# Patient Record
Sex: FEMALE | Race: WHITE | NOT HISPANIC OR LATINO | Employment: FULL TIME | URBAN - METROPOLITAN AREA
[De-identification: names, ages, dates, MRNs, and addresses within clinical notes are randomized per-mention and may not be internally consistent; named-entity substitution may affect disease eponyms.]

---

## 2017-05-05 ENCOUNTER — GENERIC CONVERSION - ENCOUNTER (OUTPATIENT)
Dept: OTHER | Facility: OTHER | Age: 47
End: 2017-05-05

## 2017-05-05 ENCOUNTER — ALLSCRIPTS OFFICE VISIT (OUTPATIENT)
Dept: OTHER | Facility: OTHER | Age: 47
End: 2017-05-05

## 2017-05-05 ENCOUNTER — APPOINTMENT (OUTPATIENT)
Dept: LAB | Facility: CLINIC | Age: 47
End: 2017-05-05
Payer: COMMERCIAL

## 2017-05-05 ENCOUNTER — TRANSCRIBE ORDERS (OUTPATIENT)
Dept: LAB | Facility: CLINIC | Age: 47
End: 2017-05-05

## 2017-05-05 DIAGNOSIS — Z00.00 ROUTINE GENERAL MEDICAL EXAMINATION AT A HEALTH CARE FACILITY: ICD-10-CM

## 2017-05-05 DIAGNOSIS — R73.01 IMPAIRED FASTING GLUCOSE: ICD-10-CM

## 2017-05-05 DIAGNOSIS — Z12.39 ENCOUNTER FOR OTHER SCREENING FOR MALIGNANT NEOPLASM OF BREAST: ICD-10-CM

## 2017-05-05 DIAGNOSIS — M54.9 DORSALGIA: Primary | ICD-10-CM

## 2017-05-05 DIAGNOSIS — R51.9 FACIAL PAIN: ICD-10-CM

## 2017-05-05 DIAGNOSIS — F06.4 ANXIETY DISORDER IN CONDITIONS CLASSIFIED ELSEWHERE: ICD-10-CM

## 2017-05-05 DIAGNOSIS — G47.00 INSOMNIA: ICD-10-CM

## 2017-05-05 DIAGNOSIS — G47.00 PERSISTENT DISORDER OF INITIATING OR MAINTAINING SLEEP: ICD-10-CM

## 2017-05-05 DIAGNOSIS — M54.9 DORSALGIA: ICD-10-CM

## 2017-05-05 DIAGNOSIS — F41.8 OTHER SPECIFIED ANXIETY DISORDERS: ICD-10-CM

## 2017-05-05 LAB
ALBUMIN SERPL BCP-MCNC: 3.9 G/DL (ref 3.5–5)
ALP SERPL-CCNC: 58 U/L (ref 46–116)
ALT SERPL W P-5'-P-CCNC: 23 U/L (ref 12–78)
AMYLASE SERPL-CCNC: 36 IU/L (ref 25–115)
ANION GAP SERPL CALCULATED.3IONS-SCNC: 7 MMOL/L (ref 4–13)
AST SERPL W P-5'-P-CCNC: 22 U/L (ref 5–45)
BASOPHILS # BLD AUTO: 0.06 THOUSANDS/ΜL (ref 0–0.1)
BASOPHILS NFR BLD AUTO: 1 % (ref 0–1)
BILIRUB SERPL-MCNC: 0.41 MG/DL (ref 0.2–1)
BILIRUB UR QL STRIP: 1
BUN SERPL-MCNC: 14 MG/DL (ref 5–25)
CALCIUM SERPL-MCNC: 9.4 MG/DL (ref 8.3–10.1)
CHLORIDE SERPL-SCNC: 103 MMOL/L (ref 100–108)
CHOLEST SERPL-MCNC: 183 MG/DL (ref 50–200)
CLARITY UR: NORMAL
CO2 SERPL-SCNC: 29 MMOL/L (ref 21–32)
COLOR UR: YELLOW
CREAT SERPL-MCNC: 0.6 MG/DL (ref 0.6–1.3)
EOSINOPHIL # BLD AUTO: 0.18 THOUSAND/ΜL (ref 0–0.61)
EOSINOPHIL NFR BLD AUTO: 3 % (ref 0–6)
ERYTHROCYTE [DISTWIDTH] IN BLOOD BY AUTOMATED COUNT: 13.7 % (ref 11.6–15.1)
ERYTHROCYTE [SEDIMENTATION RATE] IN BLOOD: 8 MM/HOUR (ref 0–20)
EST. AVERAGE GLUCOSE BLD GHB EST-MCNC: 94 MG/DL
GFR SERPL CREATININE-BSD FRML MDRD: >60 ML/MIN/1.73SQ M
GLUCOSE (HISTORICAL): NORMAL
GLUCOSE P FAST SERPL-MCNC: 85 MG/DL (ref 65–99)
HBA1C MFR BLD: 4.9 % (ref 4.2–6.3)
HCT VFR BLD AUTO: 42.5 % (ref 34.8–46.1)
HDLC SERPL-MCNC: 77 MG/DL (ref 40–60)
HGB BLD-MCNC: 13.8 G/DL (ref 11.5–15.4)
HGB UR QL STRIP.AUTO: NORMAL
KETONES UR STRIP-MCNC: NORMAL MG/DL
LDLC SERPL CALC-MCNC: 97 MG/DL (ref 0–100)
LEUKOCYTE ESTERASE UR QL STRIP: 75
LIPASE SERPL-CCNC: 276 U/L (ref 73–393)
LYMPHOCYTES # BLD AUTO: 2.84 THOUSANDS/ΜL (ref 0.6–4.47)
LYMPHOCYTES NFR BLD AUTO: 41 % (ref 14–44)
MAGNESIUM SERPL-MCNC: 2.2 MG/DL (ref 1.6–2.6)
MCH RBC QN AUTO: 30.5 PG (ref 26.8–34.3)
MCHC RBC AUTO-ENTMCNC: 32.5 G/DL (ref 31.4–37.4)
MCV RBC AUTO: 94 FL (ref 82–98)
MONOCYTES # BLD AUTO: 0.62 THOUSAND/ΜL (ref 0.17–1.22)
MONOCYTES NFR BLD AUTO: 9 % (ref 4–12)
NEUTROPHILS # BLD AUTO: 3.25 THOUSANDS/ΜL (ref 1.85–7.62)
NEUTS SEG NFR BLD AUTO: 46 % (ref 43–75)
NITRITE UR QL STRIP: NORMAL
NRBC BLD AUTO-RTO: 0 /100 WBCS
PH UR STRIP.AUTO: 5 [PH]
PLATELET # BLD AUTO: 354 THOUSANDS/UL (ref 149–390)
PMV BLD AUTO: 11 FL (ref 8.9–12.7)
POTASSIUM SERPL-SCNC: 3.7 MMOL/L (ref 3.5–5.3)
PROT SERPL-MCNC: 7.3 G/DL (ref 6.4–8.2)
PROT UR STRIP-MCNC: NORMAL MG/DL
RBC # BLD AUTO: 4.53 MILLION/UL (ref 3.81–5.12)
SODIUM SERPL-SCNC: 139 MMOL/L (ref 136–145)
SP GR UR STRIP.AUTO: 1.01
TRIGL SERPL-MCNC: 46 MG/DL
TSH SERPL DL<=0.05 MIU/L-ACNC: 0.82 UIU/ML (ref 0.36–3.74)
UROBILINOGEN UR QL STRIP.AUTO: NORMAL
WBC # BLD AUTO: 6.96 THOUSAND/UL (ref 4.31–10.16)

## 2017-05-05 PROCEDURE — 84443 ASSAY THYROID STIM HORMONE: CPT

## 2017-05-05 PROCEDURE — 83036 HEMOGLOBIN GLYCOSYLATED A1C: CPT | Performed by: FAMILY MEDICINE

## 2017-05-05 PROCEDURE — 85652 RBC SED RATE AUTOMATED: CPT | Performed by: FAMILY MEDICINE

## 2017-05-05 PROCEDURE — 80061 LIPID PANEL: CPT | Performed by: FAMILY MEDICINE

## 2017-05-05 PROCEDURE — 80053 COMPREHEN METABOLIC PANEL: CPT | Performed by: FAMILY MEDICINE

## 2017-05-05 PROCEDURE — 83690 ASSAY OF LIPASE: CPT

## 2017-05-05 PROCEDURE — 36415 COLL VENOUS BLD VENIPUNCTURE: CPT | Performed by: FAMILY MEDICINE

## 2017-05-05 PROCEDURE — 82150 ASSAY OF AMYLASE: CPT

## 2017-05-05 PROCEDURE — 83735 ASSAY OF MAGNESIUM: CPT | Performed by: FAMILY MEDICINE

## 2017-05-05 PROCEDURE — 85025 COMPLETE CBC W/AUTO DIFF WBC: CPT | Performed by: FAMILY MEDICINE

## 2017-05-06 LAB — NTI URINE TUBE (HISTORICAL): NORMAL

## 2017-05-08 LAB
CULTURE RESULT (HISTORICAL): NORMAL
QUESTION/PROBLEM (HISTORICAL): NORMAL

## 2017-05-09 ENCOUNTER — GENERIC CONVERSION - ENCOUNTER (OUTPATIENT)
Dept: OTHER | Facility: OTHER | Age: 47
End: 2017-05-09

## 2017-05-13 ENCOUNTER — GENERIC CONVERSION - ENCOUNTER (OUTPATIENT)
Dept: OTHER | Facility: OTHER | Age: 47
End: 2017-05-13

## 2017-05-15 LAB
ADEQUACY: (HISTORICAL): NORMAL
CLINICIAN PROVIDIED ICD 9 OR 10 (HISTORICAL): NORMAL
COMMENT (HISTORICAL): NORMAL
DIAGNOSIS (HISTORICAL): NORMAL
NOTE: (HISTORICAL): NORMAL
PERFORMED BY (HISTORICAL): NORMAL
REFLEX (HISTORICAL): NORMAL
TEST INFORMATION (HISTORICAL): NORMAL

## 2017-05-16 ENCOUNTER — GENERIC CONVERSION - ENCOUNTER (OUTPATIENT)
Dept: OTHER | Facility: OTHER | Age: 47
End: 2017-05-16

## 2018-01-10 NOTE — RESULT NOTES
Verified Results  Urine Dip Automated- POC [id number] 09:47AM Venetta Antes     Test Name Result Flag Reference   Color Yellow     Clarity Transparent     Leukocytes 75     Nitrite neg     Blood neg     Bilirubin 1     Urobilinogen norm     Protein neg     Ph 5     Specific Gravity 1 015     Ketone neg     Glucose norm         Plan  Abnormal fasting glucose    · (1) LIPASE; Status:Active; Requested for:05May2017;    · (1) MAGNESIUM; Status:Active; Requested for:05May2017;    · (Q) SED RATE BY MODIFIED Jas Beady; Status:Active; Requested MCQ:[id number]; Abnormal fasting glucose, Backache, Depression with anxiety, Health Maintenance,  Headache, Insomnia    · (1) HEMOGLOBIN A1C; Status:Active; Requested DUZ:[id number]; Abnormal fasting glucose, Depression with anxiety, Insomnia    · (1) TSH WITH FT4 REFLEX; Status:Active; Requested PKL:[id number]; Abnormal gynecological examination    · (1) THIN PREP PAP WITH IMAGING; Status:Active; Requested NLO:[id number]; Maturation index required? : No  HPV? : Regardless of Interpretation  Abnormal gynecological examination, Backache    · (1) URINE CULTURE; Source:Urine, Clean Catch; Status:Active; Requested  for:05May2017;   Backache, Depression with anxiety, Health Maintenance, Headache, Insomnia    · (1) AMYLASE; Status:Active; Requested for:05May2017;    · (1) CBC/PLT/DIFF; Status:Active; Requested for:05May2017;    · (1) COMPREHENSIVE METABOLIC PANEL; Status:Active; Requested for:05May2017;   Borderline hyperlipidemia    · (1) LIPID PANEL, FASTING; Status:Active; Requested FWC:[id number];    Health Maintenance    · Urine Dip Automated- POC; Status:Complete;   Done: 88TFL7050 09:47AM  Screening for malignant neoplasm of breast    · * MAMMO SCREENING BILATERAL W CAD; Status:Hold For - Scheduling; Requested  for:05May2017;

## 2018-01-11 NOTE — RESULT NOTES
Verified Results  (1) URINE CULTURE [id number] 12:00AM [facility]     Test Name Result Flag Reference   Urine Culture,Comprehensive NMU6     No urine specimen was received for culture  [facility]) Request Problem [id number] 12:00AM [facility]     Test Name Result Flag Reference   Request Problem NMU6     No urine specimen was received for culture          TEST:  [id number]  Urine Culture,Comprehensive

## 2018-01-12 VITALS
DIASTOLIC BLOOD PRESSURE: 68 MMHG | OXYGEN SATURATION: 98 % | HEIGHT: 64 IN | BODY MASS INDEX: 22.71 KG/M2 | SYSTOLIC BLOOD PRESSURE: 108 MMHG | TEMPERATURE: 98.3 F | WEIGHT: 133 LBS | HEART RATE: 64 BPM | RESPIRATION RATE: 16 BRPM

## 2018-01-12 NOTE — RESULT NOTES
Verified Results  (1) URINE CULTURE [id number] 12:00AM Kenyetta Alex     Test Name Result Flag Reference   Urine Culture,Comprehensive NMU6     No urine specimen was received for culture  [facility]) Request Problem [id number] 12:00AM Kenyetta Alex     Test Name Result Flag Reference   Request Problem NMU6     No urine specimen was received for culture          TEST:  [id number]  Urine Culture,Comprehensive

## 2018-01-15 NOTE — RESULT NOTES
Verified Results  ([facility]) Pap IG, rfx HPV ASCU [id number] 12:00AM Sherie Grubbs   No  of containers  Brittany Urrutia 01 CYTYC Thin Prep Vial     Test Name Result Flag Reference   DIAGNOSIS: Comment     NEGATIVE FOR INTRAEPITHELIAL LESION AND MALIGNANCY  THE CYTOLOGY PROCESSING WAS PERFORMED AT THE [facility] LOCATED AT  [facility], [address], [address] [place] [id number]  Specimen adequacy: Comment     Satisfactory for evaluation  Endocervical and/or squamous metaplastic  cells (endocervical component) are present  Clinician provided ICD10: Comment     Z01 411  M54 9   Performed by: Renee Franz, Cytotechnologist (ASCP)     Brittany Urrutia Note: Comment     The Pap smear is a screening test designed to aid in the detection of  premalignant and malignant conditions of the uterine cervix  It is not a  diagnostic procedure and should not be used as the sole means of detecting  cervical cancer  Both false-positive and false-negative reports do occur  Test Methodology: Comment     This liquid based ThinPrep(R) pap test was screened with the  use of an image guided system    Comment     The HPV DNA reflex criteria were not met with this specimen result  therefore, no HPV testing was performed

## 2018-01-16 NOTE — RESULT NOTES
Verified Results  (1) CBC/PLT/DIFF [id number] 10:56AM Rosana Miller     Test Name Result Flag Reference   WBC COUNT 6 96 Thousand/uL  4 31-10 16   RBC COUNT 4 53 Million/uL  3 81-5 12   HEMOGLOBIN 13 8 g/dL  11 5-15 4   HEMATOCRIT 42 5 %  34 8-46  1   MCV 94 fL  82-98   MCH 30 5 pg  26 8-34 3   MCHC 32 5 g/dL  31 4-37 4   RDW 13 7 %  11 6-15 1   MPV 11 0 fL  8 9-12 7   PLATELET COUNT 040 Thousands/uL  149-390   nRBC AUTOMATED 0 /100 WBCs     NEUTROPHILS RELATIVE PERCENT 46 %  43-75   LYMPHOCYTES RELATIVE PERCENT 41 %  14-44   MONOCYTES RELATIVE PERCENT 9 %  4-12   EOSINOPHILS RELATIVE PERCENT 3 %  0-6   BASOPHILS RELATIVE PERCENT 1 %  0-1   NEUTROPHILS ABSOLUTE COUNT 3 25 Thousands/? ??L  1 85-7 62   LYMPHOCYTES ABSOLUTE COUNT 2 84 Thousands/? ??L  0 60-4 47   MONOCYTES ABSOLUTE COUNT 0 62 Thousand/? ??L  0 17-1 22   EOSINOPHILS ABSOLUTE COUNT 0 18 Thousand/? ??L  0 00-0 61   BASOPHILS ABSOLUTE COUNT 0 06 Thousands/? ??L  0 00-0 10     (1) HEMOGLOBIN A1C [id number] 10:56AM Rosana Miller     Test Name Result Flag Reference   HEMOGLOBIN A1C 4 9 %  4 2-6 3   EST  AVG   GLUCOSE 94 mg/dl       (1) MAGNESIUM [id number] 10:56AM Rosana Miller     Test Name Result Flag Reference   MAGNESIUM 2 2 mg/dL  1 6-2 6     (1) COMPREHENSIVE METABOLIC PANEL [id number] 17:19NM Rosana Miller     Test Name Result Flag Reference   SODIUM 139 mmol/L  136-145   POTASSIUM 3 7 mmol/L  3 5-5 3   CHLORIDE 103 mmol/L  100-108   CARBON DIOXIDE 29 mmol/L  21-32   ANION GAP (CALC) 7 mmol/L  4-13   BLOOD UREA NITROGEN 14 mg/dL  5-25   CREATININE 0 60 mg/dL  0 60-1 30   Standardized to IDMS reference method   CALCIUM 9 4 mg/dL  8 3-10 1   BILI, TOTAL 0 41 mg/dL  0 20-1 00   ALK PHOSPHATAS 58 U/L     ALT (SGPT) 23 U/L  12-78   AST(SGOT) 22 U/L  5-45   ALBUMIN 3 9 g/dL  3 5-5 0   TOTAL PROTEIN 7 3 g/dL  6 4-8 2   eGFR Non-African American      >60 0 ml/min/1 73sq van [employer] Disease Education Program recommendations are as follows:  GFR calculation is accurate only with a steady state creatinine  Chronic Kidney disease less than 60 ml/min/1 73 sq  meters  Kidney failure less than 15 ml/min/1 73 sq  meters  GLUCOSE FASTING 85 mg/dL  65-99     (1) LIPID PANEL, FASTING [id number] 10:56AM Thoundsnetta Opal     Test Name Result Flag Reference   CHOLESTEROL 183 mg/dL     HDL,DIRECT 77 mg/dL H 40-60   Specimen collection should occur prior to Metamizole administration due to the potential for falsely depressed results  LDL CHOLESTEROL CALCULATED 97 mg/dL  0-100   Triglyceride:         Normal              <150 mg/dl       Borderline High    150-199 mg/dl       High               200-499 mg/dl       Very High          >499 mg/dl  Cholesterol:         Desirable        <200 mg/dl      Borderline High  200-239 mg/dl      High             >239 mg/dl  HDL Cholesterol:        High    >59 mg/dL      Low     <41 mg/dL  LDL CALCULATED:    This screening LDL is a calculated result  It does not have the accuracy of the Direct Measured LDL in the monitoring of patients with hyperlipidemia and/or statin therapy  Direct Measure LDL (WQK696) must be ordered separately in these patients  TRIGLYCERIDES 46 mg/dL  <=150   Specimen collection should occur prior to N-Acetylcysteine or Metamizole administration due to the potential for falsely depressed results  (1) AMYLASE [id number] 10:56AM blur Groupa Opal     Test Name Result Flag Reference   AMYLASE 36 IU/L       (1) LIPASE [id number] 10:56AM blur Groupa Parma     Test Name Result Flag Reference   LIPASE 276 u/L       (1) TSH WITH FT4 REFLEX [id number] 10:56AM Bonnetta Opal     Test Name Result Flag Reference   TSH 0 816 uIU/mL  0 358-3 740   Patients undergoing fluorescein dye angiography may retain small amounts of fluorescein in the body for 48-72 hours post procedure  Samples containing fluorescein can produce falsely depressed TSH values   If the patient had this procedure,a specimen should be resubmitted post fluorescein clearance  The recommended reference ranges for TSH during pregnancy are as follows:  First trimester 0 1 to 2 5 uIU/mL  Second trimester  0 2 to 3 0 uIU/mL  Third trimester 0 3 to 3 0 uIU/m     (1) SED RATE [id number] 10:56AM Pati Royalty     Test Name Result Flag Reference   SED RATE 8 mm/hour  0-20     Urine Dip Automated- POC [id number] 09:47AM Pati Royalty     Test Name Result Flag Reference   Color Yellow     Clarity Transparent     Leukocytes 75     Nitrite neg     Blood neg     Bilirubin 1     Urobilinogen norm     Protein neg     Ph 5     Specific Gravity 1 015     Ketone neg     Glucose norm       (LC) NTI Urine Tube [id number] 12:00AM Pati Royalty     Test Name Result Flag Reference   NTI Urine Tube Comment     Dear Doctor,           The requisition we received for the above patient has           no test indicated on the request form for one or more           of the specimens submitted  The [place] Code           of [employer] requires a written and signed           request be forwarded to the testing laboratory           following the verbal order of a laboratory test            Please complete the following and fax to           [phone] to expedite testing  Required test name(s)_______________________________           Required test number(s)_____________________________           Physician signature_________________________________           Date________________________________________________           Diagnosis Code:_____________________________________           In order to maintain sample integrity, samples will           be stored for two to seven days from the date of           receipt  A urine culture transport was received with no test indicated   If  testing is required on this specimen, please contact the [address] [place], [place] [address]/Technical Services Department to obtain a Request for  Written Authorization Form  Plan  Abnormal fasting glucose    · (1) LIPASE; Status:Active; Requested for:05May2017;    · (1) MAGNESIUM; Status:Active; Requested for:05May2017;    · (Q) SED RATE BY MODIFIED Shruthi Belts; Status:Active; Requested ELLEN:[id number]; Abnormal fasting glucose, Backache, Depression with anxiety, Health Maintenance,  Headache, Insomnia    · (1) HEMOGLOBIN A1C; Status:Active; Requested UVL:[id number]; Abnormal fasting glucose, Depression with anxiety, Insomnia    · (1) TSH WITH FT4 REFLEX; Status:Active; Requested NMR:[id number]; Abnormal gynecological examination    · (1) THIN PREP PAP WITH IMAGING; Status:Active; Requested ZLX:[id number]; Maturation index required? : No  HPV? : Regardless of Interpretation  Abnormal gynecological examination, Backache    · (1) URINE CULTURE; Source:Urine, Clean Catch; Status:Active; Requested  for:05May2017;   Backache, Depression with anxiety, Health Maintenance, Headache, Insomnia    · (1) AMYLASE; Status:Active; Requested for:05May2017;    · (1) CBC/PLT/DIFF; Status:Active; Requested for:05May2017;    · (1) COMPREHENSIVE METABOLIC PANEL; Status:Active; Requested for:05May2017;   Borderline hyperlipidemia    · (1) LIPID PANEL, FASTING; Status:Active; Requested UFY:[id number];    Health Maintenance    · Urine Dip Automated- POC; Status:Complete;   Done: 49YKK6249 09:47AM  Screening for malignant neoplasm of breast    · * MAMMO SCREENING BILATERAL W CAD; Status:Hold For - Scheduling; Requested  for:05May2017;

## 2018-01-16 NOTE — PROGRESS NOTES
Assessment   1  Encounter for preventive health examination (V70 0) (Z00 00)  2  Depression with anxiety (300 4) (F41 8)  3  Headache (784 0) (R51)  4  Insomnia (780 52) (G47 00)  5  Borderline hyperlipidemia (272 4) (E78 5)    Plan  Abnormal fasting glucose    · (1) LIPASE; Status:Active; Requested for:05May2017;    · (1) MAGNESIUM; Status:Active; Requested for:05May2017;    · (Q) SED RATE BY WALT Valdovinos; Status:Active; Requested MZR:[id number]; Abnormal fasting glucose, Backache, Depression with anxiety, Health Maintenance,  Headache, Insomnia    · (1) HEMOGLOBIN A1C; Status:Active; Requested ISP:[id number]; Abnormal fasting glucose, Depression with anxiety, Insomnia    · (1) TSH WITH FT4 REFLEX; Status:Active; Requested QAO:[id number]; Abnormal gynecological examination, Backache    · 1   Backache, Depression with anxiety, Health Maintenance, Headache, Insomnia    · (1) AMYLASE; Status:Active; Requested for:05May2017;    · (1) CBC/PLT/DIFF; Status:Active; Requested for:05May2017;    · (1) COMPREHENSIVE METABOLIC PANEL; Status:Active; Requested for:05May2017;   Borderline hyperlipidemia    · (1) LIPID PANEL, FASTING; Status:Active; Requested for:05May2017;   Depression with anxiety    · Renew: Escitalopram Oxalate 20 MG Oral Tablet; TAKE 1 AND 1/2 TABLETS DAILY1   Health Maintenance    · Urine Dip Automated- POC; Status:Complete;   Done: 28SPK0318 09:47AM  Papanicolaou smear for cervical cancer screening    · (1) THIN PREP PAP WITH IMAGING; Status:Active; Requested for:05May2017;   PAP Maturation index required? : No  Reflex HPV? : Regardless of Interpretation  Screening for malignant neoplasm of breast    · * MAMMO SCREENING BILATERAL W CAD; Status:Hold For - Scheduling; Requested  for:05May2017;      1 Amended By: Aby Whitfield ; May 09 2017 11:32 PM EST    Discussion/Summary  health maintenance visit Currently, she eats a healthy diet and has an adequate exercise regimen   Pap test with reflex HPV testing was done today Breast cancer screening: the risks and benefits of breast cancer screening were discussed and mammogram is current  Colorectal cancer screening: the risks and benefits of colorectal cancer screening were discussed  Osteoporosis screening: the risks and benefits of osteoporosis screening were discussed  Screening lab work includes hemoglobin, glucose, lipid profile, thyroid function testing, 25-hydroxyvitamin D and urinalysis  Advice and education were given regarding nutrition, weight bearing exercise, calcium supplements, vitamin D supplements, self skin examination, helmet use, seat belt use and advanced directive planning  Patient discussion: discussed with the patient  Possible side effects of new medications were reviewed with the patient/guardian today  The treatment plan was reviewed with the patient/guardian  The patient/guardian understands and agrees with the treatment plan      Chief Complaint  pt here for pap and PE pt would like to discuss headaches and hearing issues  tc/cma      History of Present Illness  HM, Adult Female: The patient is being seen for a health maintenance evaluation  General Health: The patient's health since the last visit is described as good  She has regular dental visits  She complains of vision problems  Lifestyle:  She consumes a diverse and healthy diet  She does not have any weight concerns  She exercises regularly  She does not use tobacco  She consumes alcohol  She reports occasional alcohol use  She denies drug use  Reproductive health: the patient is perimenopausal   she is sexually active  pregnancy history: G 3P 2(miscarriages: 1 )  Screening: cancer screening reviewed and updated  metabolic screening reviewed and updated  risk screening reviewed and updated  Review of Systems    Constitutional: feeling tired  Eyes: eyesight problems     ENT: no complaints of earache, no loss of hearing, no nose bleeds, no nasal discharge, no sore throat, no hoarseness  Cardiovascular: No complaints of slow heart rate, no fast heart rate, no chest pain, no palpitations, no leg claudication, no lower extremity edema  Respiratory: No complaints of shortness of breath, no wheezing, no cough, no SOB on exertion, no orthopnea, no PND  Gastrointestinal: No complaints of abdominal pain, no constipation, no nausea or vomiting, no diarrhea, no bloody stools  Genitourinary: No complaints of dysuria, no incontinence, no pelvic pain, no dysmenorrhea, no vaginal discharge or bleeding  Musculoskeletal: arthralgias and myalgias  Integumentary: skin lesion  Neurological: headache  Psychiatric: anxiety and sleep disturbances, but not suicidal    Endocrine: No complaints of proptosis, no hot flashes, no muscle weakness, no deepening of the voice, no feelings of weakness  Hematologic/Lymphatic: No complaints of swollen glands, no swollen glands in the neck, does not bleed easily, does not bruise easily  Active Problems   1  _ (V72 31)  2  Abnormal fasting glucose (790 29) (R73 01)  3  Abnormal gynecological examination (V72 31) (Z01 411)  4  Backache (724 5) (M54 9)  5  Borderline hyperlipidemia (272 4) (E78 5)  6  Depression with anxiety (300 4) (F41 8)  7  Headache (784 0) (R51)  8  Insomnia (780 52) (G47 00)  9  Oral contraceptive prescribed (V25 01) (Z30 011)  10  Screening for malignant neoplasm of breast (V76 10) (Z12 39)  11  Well adult on routine health check (V70 0) (Z00 00)    Past Medical History    · Oral contraceptive prescribed (V25 01) (Z30 011)    Family History  Mother    · Family history of Diabetes Mellitus (V18 0)   · Family history of Hypertension (V17 49)  Brother    · Family history of Adrenal Insufficiency    Social History    · Former smoker (V15 82) (F35 378)   · Marital History - Currently    · Occupation:    Current Meds  1  Escitalopram Oxalate 20 MG Oral Tablet; TAKE 1 AND 1/2 TABLETS DAILY;    Therapy: 96HXB8885 to (Evaluate:17Vak1922)  Requested for: 99Chp0412; Last   Rx:[id number] Ordered    Allergies   1  No Known Drug Allergies    Immunizations   1 2    Influenza  11-Jan-2013  (42y) 26-Oct-2015  (45y)    Tdap  12-Nov-2012  (42y)      Vitals   Recorded: 68ASB7925 09:16AM   Temperature 98 3 F, Temporal   Heart Rate 64, R Radial   Pulse Quality Normal, R Radial   Respiration Quality Normal   Respiration 16   Systolic 388, RUE, Sitting   Diastolic 68, RUE, Sitting   Height 5 ft 4 25 in   Weight 133 lb    BMI Calculated 22 65   BSA Calculated 1 65   O2 Saturation 98     Physical Exam    Constitutional   General appearance: No acute distress, well appearing and well nourished  Eyes   Conjunctiva and lids: No swelling, erythema or discharge  Pupils and irises: Equal, round, reactive to light  Ears, Nose, Mouth, and Throat   External inspection of ears and nose: Normal     Otoscopic examination: Tympanic membranes translucent with normal light reflex  Canals patent without erythema  Hearing: Normal     Nasal mucosa, septum, and turbinates: Normal without edema or erythema  Lips, teeth, and gums: Normal, good dentition  Oropharynx: Normal with no erythema, edema, exudate or lesions  Neck   Neck: Supple, symmetric, trachea midline, no masses  Thyroid: Normal, no thyromegaly  Pulmonary   Respiratory effort: No increased work of breathing or signs of respiratory distress  Auscultation of lungs: Clear to auscultation  Cardiovascular   Auscultation of heart: Normal rate and rhythm, normal S1 and S2, no murmurs  Carotid pulses: 2+ bilaterally  Pedal pulses: 2+ bilaterally  Examination of extremities for edema and/or varicosities: Normal     Chest   Breasts: Normal, no dimpling or skin changes appreciated  Palpation of breasts and axillae: Normal, no masses palpated  Abdomen   Abdomen: Non-tender, no masses  Liver and spleen: No hepatomegaly or splenomegaly      Examination for hernias: No hernia appreciated  Genitourinary   External genitalia and vagina: Normal, no lesions appreciated  Urethra: Normal, no discharge  Bladder: Not distended, no tenderness  Cervix: Normal, no lesions, Pap obtained  Uterus: Normal size, no tenderness, no masses  Adnexa/Parametria: Normal, no masses or tenderness  Lymphatic   Palpation of lymph nodes in neck: No lymphadenopathy  Palpation of lymph nodes in axillae: No lymphadenopathy  Palpation of lymph nodes in groin: No lymphadenopathy  Musculoskeletal   Gait and station: Normal     Digits and nails: Normal without clubbing or cyanosis  Joints, bones, and muscles: Normal     Range of motion: Normal     Stability: Normal     Muscle strength/tone: Normal     Skin   Skin and subcutaneous tissue: Normal without rashes or lesions  Neurologic   Cranial nerves: Cranial nerves II-XII intact  Reflexes: 2+ and symmetric  Sensation: No sensory loss  Psychiatric   Orientation to person, place, and time: Normal     Mood and affect: Normal        Results/Data  (1) CBC/PLT/DIFF [id number] 10:56AM Priti Quan     Test Name Result Flag Reference   WBC COUNT 6 96 Thousand/uL  4 31-10 16   RBC COUNT 4 53 Million/uL  3 81-5 12   HEMOGLOBIN 13 8 g/dL  11 5-15 4   HEMATOCRIT 42 5 %  34 8-46  1   MCV 94 fL  82-98   MCH 30 5 pg  26 8-34 3   MCHC 32 5 g/dL  31 4-37 4   RDW 13 7 %  11 6-15 1   MPV 11 0 fL  8 9-12 7   PLATELET COUNT 603 Thousands/uL  149-390   nRBC AUTOMATED 0 /100 WBCs     NEUTROPHILS RELATIVE PERCENT 46 %  43-75   LYMPHOCYTES RELATIVE PERCENT 41 %  14-44   MONOCYTES RELATIVE PERCENT 9 %  4-12   EOSINOPHILS RELATIVE PERCENT 3 %  0-6   BASOPHILS RELATIVE PERCENT 1 %  0-1   NEUTROPHILS ABSOLUTE COUNT 3 25 Thousands/? ??L  1 85-7 62   LYMPHOCYTES ABSOLUTE COUNT 2 84 Thousands/? ??L  0 60-4 47   MONOCYTES ABSOLUTE COUNT 0 62 Thousand/? ??L  0 17-1 22   EOSINOPHILS ABSOLUTE COUNT 0 18 Thousand/? ??L  0 00-0 61   BASOPHILS ABSOLUTE COUNT 0 06 Thousands/? ??L  0 00-0 10     (1) HEMOGLOBIN A1C [id number] 10:56AM Jewels Linda     Test Name Result Flag Reference   HEMOGLOBIN A1C 4 9 %  4 2-6 3   EST  AVG  GLUCOSE 94 mg/dl       (1) MAGNESIUM [id number] 10:56AM Jewels Linda     Test Name Result Flag Reference   MAGNESIUM 2 2 mg/dL  1 6-2 6     Urine Dip Automated- POC [id number] 09:47AM Jewels Linda     Test Name Result Flag Reference   Color Yellow     Clarity Transparent     Leukocytes 75     Nitrite neg     Blood neg     Bilirubin 1     Urobilinogen norm     Protein neg     Ph 5     Specific Gravity 1 015     Ketone neg     Glucose norm         Procedure    Procedure:  pt sees  yearly  Health Management  Screening for malignant neoplasm of breast   MAMMO SCREENING BILATERAL W 3D & CAD; every 1 year; Last 77HQF2810; Next Due:  72EPF3041;  Overdue    Signatures   Electronically signed by : SOFIA De Guzman ; May  9 2017 11:35PM EST                       (Author)

## 2018-01-17 NOTE — RESULT NOTES
Discussion/Summary   Labs overall good--please call if any further questions/concerns--Best Regards-Dr FREEMAN     Verified Results  (1) CBC/PLT/DIFF [id number] 10:56AM Cole Vogel     Test Name Result Flag Reference   WBC COUNT 6 96 Thousand/uL  4 31-10 16   RBC COUNT 4 53 Million/uL  3 81-5 12   HEMOGLOBIN 13 8 g/dL  11 5-15 4   HEMATOCRIT 42 5 %  34 8-46  1   MCV 94 fL  82-98   MCH 30 5 pg  26 8-34 3   MCHC 32 5 g/dL  31 4-37 4   RDW 13 7 %  11 6-15 1   MPV 11 0 fL  8 9-12 7   PLATELET COUNT 740 Thousands/uL  149-390   nRBC AUTOMATED 0 /100 WBCs     NEUTROPHILS RELATIVE PERCENT 46 %  43-75   LYMPHOCYTES RELATIVE PERCENT 41 %  14-44   MONOCYTES RELATIVE PERCENT 9 %  4-12   EOSINOPHILS RELATIVE PERCENT 3 %  0-6   BASOPHILS RELATIVE PERCENT 1 %  0-1   NEUTROPHILS ABSOLUTE COUNT 3 25 Thousands/? ??L  1 85-7 62   LYMPHOCYTES ABSOLUTE COUNT 2 84 Thousands/? ??L  0 60-4 47   MONOCYTES ABSOLUTE COUNT 0 62 Thousand/? ??L  0 17-1 22   EOSINOPHILS ABSOLUTE COUNT 0 18 Thousand/? ??L  0 00-0 61   BASOPHILS ABSOLUTE COUNT 0 06 Thousands/? ??L  0 00-0 10     (1) HEMOGLOBIN A1C [id number] 10:56AM Cole Vogel     Test Name Result Flag Reference   HEMOGLOBIN A1C 4 9 %  4 2-6 3   EST  AVG   GLUCOSE 94 mg/dl       (1) MAGNESIUM [id number] 10:56AM Cole Vogel     Test Name Result Flag Reference   MAGNESIUM 2 2 mg/dL  1 6-2 6     (1) COMPREHENSIVE METABOLIC PANEL [id number] 81:33FB Cole Vogel     Test Name Result Flag Reference   SODIUM 139 mmol/L  136-145   POTASSIUM 3 7 mmol/L  3 5-5 3   CHLORIDE 103 mmol/L  100-108   CARBON DIOXIDE 29 mmol/L  21-32   ANION GAP (CALC) 7 mmol/L  4-13   BLOOD UREA NITROGEN 14 mg/dL  5-25   CREATININE 0 60 mg/dL  0 60-1 30   Standardized to IDMS reference method   CALCIUM 9 4 mg/dL  8 3-10 1   BILI, TOTAL 0 41 mg/dL  0 20-1 00   ALK PHOSPHATAS 58 U/L     ALT (SGPT) 23 U/L  12-78   AST(SGOT) 22 U/L  5-45   ALBUMIN 3 9 g/dL  3 5-5 0   TOTAL PROTEIN 7 3 g/dL  6 4-8 2   eGFR Non- American      >60 0 ml/min/1 73sq [facility] Disease Education Program recommendations are as follows:  GFR calculation is accurate only with a steady state creatinine  Chronic Kidney disease less than 60 ml/min/1 73 sq  meters  Kidney failure less than 15 ml/min/1 73 sq  meters  GLUCOSE FASTING 85 mg/dL  65-99     (1) LIPID PANEL, FASTING [id number] 10:56AM Aditya Bame     Test Name Result Flag Reference   CHOLESTEROL 183 mg/dL     HDL,DIRECT 77 mg/dL H 40-60   Specimen collection should occur prior to Metamizole administration due to the potential for falsely depressed results  LDL CHOLESTEROL CALCULATED 97 mg/dL  0-100   Triglyceride:         Normal              <150 mg/dl       Borderline High    150-199 mg/dl       High               200-499 mg/dl       Very High          >499 mg/dl  Cholesterol:         Desirable        <200 mg/dl      Borderline High  200-239 mg/dl      High             >239 mg/dl  HDL Cholesterol:        High    >59 mg/dL      Low     <41 mg/dL  LDL CALCULATED:    This screening LDL is a calculated result  It does not have the accuracy of the Direct Measured LDL in the monitoring of patients with hyperlipidemia and/or statin therapy  Direct Measure LDL ([id number]) must be ordered separately in these patients  TRIGLYCERIDES 46 mg/dL  <=150   Specimen collection should occur prior to N-Acetylcysteine or Metamizole administration due to the potential for falsely depressed results  (1) AMYLASE [id number] 10:56AM Aditya Bame     Test Name Result Flag Reference   AMYLASE 36 IU/L       (1) LIPASE [id number] 10:56AM Aditya Bame     Test Name Result Flag Reference   LIPASE 276 u/L       (1) TSH WITH FT4 REFLEX [id number] 10:56AM Aditya Bame     Test Name Result Flag Reference   TSH 0 816 uIU/mL  0 358-3 740   Patients undergoing fluorescein dye angiography may retain small amounts of fluorescein in the body for 48-72 hours post procedure  Samples containing fluorescein can produce falsely depressed TSH values  If the patient had this procedure,a specimen should be resubmitted post fluorescein clearance  The recommended reference ranges for TSH during pregnancy are as follows:  First trimester 0 1 to 2 5 uIU/mL  Second trimester  0 2 to 3 0 uIU/mL  Third trimester 0 3 to 3 0 uIU/m     (1) SED RATE [id number] 10:56AM Pattricia Hamming     Test Name Result Flag Reference   SED RATE 8 mm/hour  0-20     Urine Dip Automated- POC [id number] 09:47AM Pattricia Hamming     Test Name Result Flag Reference   Color Yellow     Clarity Transparent     Leukocytes 75     Nitrite neg     Blood neg     Bilirubin 1     Urobilinogen norm     Protein neg     Ph 5     Specific Gravity 1 015     Ketone neg     Glucose norm         Plan  Abnormal fasting glucose    · (1) LIPASE; Status:Active; Requested for:05May2017;    · (1) MAGNESIUM; Status:Active; Requested for:05May2017;    · (Q) SED RATE BY MODIFIED Debbrah Carlos; Status:Active; Requested IRY:[id number]; Abnormal fasting glucose, Backache, Depression with anxiety, Health Maintenance,  Headache, Insomnia    · (1) HEMOGLOBIN A1C; Status:Active; Requested XOQ:[id number]; Abnormal fasting glucose, Depression with anxiety, Insomnia    · (1) TSH WITH FT4 REFLEX; Status:Active; Requested ROLY:[id number]; Abnormal gynecological examination    · (1) THIN PREP PAP WITH IMAGING; Status:Active; Requested SXL:[id number]; Maturation index required? : No  HPV? : Regardless of Interpretation  Abnormal gynecological examination, Backache    · (1) URINE CULTURE; Source:Urine, Clean Catch; Status:Active; Requested  for:05May2017;   Backache, Depression with anxiety, Health Maintenance, Headache, Insomnia    · (1) AMYLASE; Status:Active; Requested for:05May2017;    · (1) CBC/PLT/DIFF; Status:Active; Requested for:05May2017;    · (1) COMPREHENSIVE METABOLIC PANEL; Status:Active;  Requested for:05May2017;   Borderline hyperlipidemia · (1) LIPID PANEL, FASTING; Status:Active; Requested [id number]:[id number];    Health Maintenance    · Urine Dip Automated- POC; Status:Complete;   Done: [id number] 09:47AM  Screening for malignant neoplasm of breast    · * MAMMO SCREENING BILATERAL W CAD; Status:Hold For - Scheduling; Requested  for:[id number];

## 2018-02-02 DIAGNOSIS — F41.9 ANXIETY: Primary | ICD-10-CM

## 2018-02-02 RX ORDER — VENLAFAXINE HYDROCHLORIDE 150 MG/1
150 TABLET, EXTENDED RELEASE ORAL DAILY
Qty: 60 TABLET | Refills: 0 | Status: SHIPPED | OUTPATIENT
Start: 2018-02-02 | End: 2018-02-27 | Stop reason: SDUPTHER

## 2018-02-02 RX ORDER — VENLAFAXINE HYDROCHLORIDE 150 MG/1
2 TABLET, EXTENDED RELEASE ORAL DAILY
COMMUNITY
Start: 2017-11-26 | End: 2018-02-02 | Stop reason: SDUPTHER

## 2018-02-27 DIAGNOSIS — F41.9 ANXIETY: ICD-10-CM

## 2018-03-02 RX ORDER — VENLAFAXINE HYDROCHLORIDE 150 MG/1
150 TABLET, EXTENDED RELEASE ORAL DAILY
Qty: 60 TABLET | Refills: 0 | Status: SHIPPED | OUTPATIENT
Start: 2018-03-02 | End: 2018-05-07 | Stop reason: DRUGHIGH

## 2018-05-07 ENCOUNTER — OFFICE VISIT (OUTPATIENT)
Dept: FAMILY MEDICINE CLINIC | Facility: CLINIC | Age: 48
End: 2018-05-07
Payer: COMMERCIAL

## 2018-05-07 VITALS
RESPIRATION RATE: 14 BRPM | WEIGHT: 133.8 LBS | BODY MASS INDEX: 22.84 KG/M2 | HEIGHT: 64 IN | SYSTOLIC BLOOD PRESSURE: 100 MMHG | TEMPERATURE: 98.2 F | DIASTOLIC BLOOD PRESSURE: 80 MMHG | HEART RATE: 76 BPM

## 2018-05-07 DIAGNOSIS — Z12.11 ENCOUNTER FOR COLORECTAL CANCER SCREENING: ICD-10-CM

## 2018-05-07 DIAGNOSIS — F41.9 ANXIETY: ICD-10-CM

## 2018-05-07 DIAGNOSIS — Z12.12 ENCOUNTER FOR COLORECTAL CANCER SCREENING: ICD-10-CM

## 2018-05-07 DIAGNOSIS — Z00.00 PHYSICAL EXAM: Primary | ICD-10-CM

## 2018-05-07 DIAGNOSIS — E78.5 HYPERLIPIDEMIA, UNSPECIFIED HYPERLIPIDEMIA TYPE: ICD-10-CM

## 2018-05-07 DIAGNOSIS — R30.0 DYSURIA: ICD-10-CM

## 2018-05-07 DIAGNOSIS — Z12.9 ENCOUNTER FOR SCREENING FOR MALIGNANT NEOPLASM: ICD-10-CM

## 2018-05-07 DIAGNOSIS — R10.10 UPPER ABDOMINAL PAIN: ICD-10-CM

## 2018-05-07 PROBLEM — R73.01 ABNORMAL FASTING GLUCOSE: Status: ACTIVE | Noted: 2017-05-05

## 2018-05-07 LAB
SL AMB  POCT GLUCOSE, UA: NORMAL
SL AMB LEUKOCYTE ESTERASE,UA: NORMAL
SL AMB POCT BILIRUBIN,UA: NORMAL
SL AMB POCT BLOOD,UA: NORMAL
SL AMB POCT CLARITY,UA: NORMAL
SL AMB POCT COLOR,UA: YELLOW
SL AMB POCT KETONES,UA: NORMAL
SL AMB POCT NITRITE,UA: NORMAL
SL AMB POCT PH,UA: 8
SL AMB POCT SPECIFIC GRAVITY,UA: 1.01
SL AMB POCT URINE PROTEIN: NORMAL
SL AMB POCT UROBILINOGEN: NORMAL

## 2018-05-07 PROCEDURE — 99396 PREV VISIT EST AGE 40-64: CPT | Performed by: FAMILY MEDICINE

## 2018-05-07 PROCEDURE — 81003 URINALYSIS AUTO W/O SCOPE: CPT | Performed by: FAMILY MEDICINE

## 2018-05-07 RX ORDER — VENLAFAXINE HYDROCHLORIDE 150 MG/1
150 CAPSULE, EXTENDED RELEASE ORAL DAILY
Qty: 90 CAPSULE | Refills: 3 | Status: SHIPPED | OUTPATIENT
Start: 2018-05-07 | End: 2018-06-20 | Stop reason: SDUPTHER

## 2018-05-07 RX ORDER — RANITIDINE 150 MG/1
150 CAPSULE ORAL 2 TIMES DAILY
Qty: 60 CAPSULE | Refills: 3 | Status: SHIPPED | OUTPATIENT
Start: 2018-05-07 | End: 2018-08-08 | Stop reason: HOSPADM

## 2018-06-20 DIAGNOSIS — F41.9 ANXIETY: ICD-10-CM

## 2018-06-20 NOTE — TELEPHONE ENCOUNTER
Dr Amari Mallory:    Patient states the directions on her prescription is wrong  It's supposed to be 2x per day not 1x per day  Please contact her with any questions

## 2018-06-21 RX ORDER — VENLAFAXINE HYDROCHLORIDE 150 MG/1
CAPSULE, EXTENDED RELEASE ORAL
Qty: 180 CAPSULE | Refills: 1 | Status: SHIPPED | OUTPATIENT
Start: 2018-06-21 | End: 2019-02-03 | Stop reason: DRUGHIGH

## 2018-07-03 ENCOUNTER — TELEPHONE (OUTPATIENT)
Dept: GASTROENTEROLOGY | Facility: CLINIC | Age: 48
End: 2018-07-03

## 2018-07-03 NOTE — TELEPHONE ENCOUNTER
Left patient VM to confirm 7/6 OV and to Verify ins  No ins card scanned in for patient   Please make sure ins referral is not needed for 7/6 OV

## 2018-07-06 ENCOUNTER — OFFICE VISIT (OUTPATIENT)
Dept: GASTROENTEROLOGY | Facility: CLINIC | Age: 48
End: 2018-07-06
Payer: COMMERCIAL

## 2018-07-06 VITALS
WEIGHT: 136.8 LBS | HEART RATE: 76 BPM | DIASTOLIC BLOOD PRESSURE: 72 MMHG | SYSTOLIC BLOOD PRESSURE: 116 MMHG | HEIGHT: 64 IN | BODY MASS INDEX: 23.35 KG/M2 | TEMPERATURE: 97.6 F

## 2018-07-06 DIAGNOSIS — Z12.12 ENCOUNTER FOR COLORECTAL CANCER SCREENING: ICD-10-CM

## 2018-07-06 DIAGNOSIS — R10.13 DYSPEPSIA: Primary | ICD-10-CM

## 2018-07-06 DIAGNOSIS — Z12.11 ENCOUNTER FOR COLORECTAL CANCER SCREENING: ICD-10-CM

## 2018-07-06 DIAGNOSIS — K59.00 CONSTIPATION, UNSPECIFIED CONSTIPATION TYPE: ICD-10-CM

## 2018-07-06 PROCEDURE — 99204 OFFICE O/P NEW MOD 45 MIN: CPT | Performed by: INTERNAL MEDICINE

## 2018-07-06 NOTE — PROGRESS NOTES
Consultation - [facility]  [place] 1970 female         Chief Complaint:  Screening colonoscopy, bloating    HPI:  47-year-old female with no significant past medical history was referred for colonoscopy  Patient never had colonoscopy in the past   She reports having issues with bloating and gassiness in her stomach  She has problems with her bowels with episodes of constipation  Denies any blood or mucus in the stool  Good appetite, no recent weight loss  Denies any heartburn acid reflux  She was started on Zantac a month ago without any significant difference  REVIEW OF SYSTEMS: Review of Systems   Constitutional: Negative for activity change, appetite change, chills, diaphoresis, fatigue, fever and unexpected weight change  HENT: Negative for ear discharge, ear pain, facial swelling, hearing loss, nosebleeds, sore throat, tinnitus and voice change  Eyes: Negative for pain, discharge, redness, itching and visual disturbance  Respiratory: Negative for apnea, cough, chest tightness, shortness of breath and wheezing  Cardiovascular: Negative for chest pain and palpitations  Gastrointestinal:        As noted in HPI   Endocrine: Negative for cold intolerance, heat intolerance and polyuria  Genitourinary: Negative for difficulty urinating, dysuria, flank pain, hematuria and urgency  Musculoskeletal: Negative for arthralgias, back pain, gait problem, joint swelling and myalgias  Skin: Negative for rash and wound  Neurological: Negative for dizziness, tremors, seizures, speech difficulty, light-headedness, numbness and headaches  Hematological: Negative for adenopathy  Does not bruise/bleed easily  Psychiatric/Behavioral: Negative for agitation, behavioral problems and confusion  The patient is not nervous/anxious           Past Medical History:   Diagnosis Date    Oral contraceptive prescribed     last assessed - [id number]      Past Surgical History: Procedure Laterality Date    NO PAST SURGERIES       Social History     Social History    Marital status: /Civil Union     Spouse name: N/A    Number of children: N/A    Years of education: N/A     Occupational History    Active      Social History Main Topics    Smoking status: Former Smoker    Smokeless tobacco: Never Used    Alcohol use Yes      Comment: occasional    Drug use: No    Sexual activity: Yes     Other Topics Concern    Not on file     Social History Narrative    No narrative on file     Family History   Problem Relation Age of Onset    Diabetes Mother     Hypertension Mother     Adrenal disorder Brother         Adrenal insufficiency     Patient has no known allergies  Current Outpatient Prescriptions   Medication Sig Dispense Refill    RESTASIS MULTIDOSE 0 05 % ophthalmic emulsion       venlafaxine (EFFEXOR-XR) 150 mg 24 hr capsule 2 caps po daily 180 capsule 1    Na Sulfate-K Sulfate-Mg Sulf (SUPREP BOWEL PREP KIT) 17 5-3 13-1 6 GM/180ML SOLN Take 2 Bottles by mouth see administration instructions Please follow the instructions from the office 2 Bottle 0    ranitidine (ZANTAC) 150 MG capsule Take 1 capsule (150 mg total) by mouth 2 (two) times a day 60 capsule 3     No current facility-administered medications for this visit  Blood pressure 116/72, pulse 76, temperature 97 6 °F (36 4 °C), temperature source Tympanic, height 5' 4" (1 626 m), weight 62 1 kg (136 lb 12 8 oz)  PHYSICAL EXAM: Physical Exam   Constitutional: She is oriented to person, place, and time  She appears well-developed and well-nourished  HENT:   Head: Normocephalic and atraumatic  Nose: Nose normal    Mouth/Throat: Oropharynx is clear and moist    Eyes: Conjunctivae are normal  Right eye exhibits no discharge  Left eye exhibits no discharge  No scleral icterus  Neck: Neck supple  No JVD present  No tracheal deviation present  No thyromegaly present     Cardiovascular: Normal rate, regular rhythm and normal heart sounds  Exam reveals no gallop and no friction rub  No murmur heard  Pulmonary/Chest: Effort normal and breath sounds normal  No respiratory distress  She has no wheezes  She has no rales  She exhibits no tenderness  Abdominal: Soft  Bowel sounds are normal  She exhibits no distension and no mass  There is no tenderness  There is no rebound and no guarding  No hernia  Musculoskeletal: She exhibits no edema, tenderness or deformity  Lymphadenopathy:     She has no cervical adenopathy  Neurological: She is alert and oriented to person, place, and time  Skin: Skin is warm and dry  No rash noted  No erythema  Psychiatric: She has a normal mood and affect  Her behavior is normal  Thought content normal         Lab Results   Component Value Date    WBC 6 96 05/05/2017    HGB 13 8 05/05/2017    HCT 42 5 05/05/2017    MCV 94 05/05/2017     05/05/2017     Lab Results   Component Value Date    CALCIUM 9 4 05/05/2017     05/05/2017    K 3 7 05/05/2017    CO2 29 05/05/2017     05/05/2017    BUN 14 05/05/2017    CREATININE 0 60 05/05/2017     Lab Results   Component Value Date    ALT 23 05/05/2017    AST 22 05/05/2017    ALKPHOS 58 05/05/2017    BILITOT 0 41 05/05/2017     No results found for: INR, PROTIME    Patient was never admitted  ASSESSMENT & PLAN:    Encounter for colorectal cancer screening  Screening for colon cancer - patient is at average risk for colon cancer screening  Rule out colorectal lesions including polyps or malignancy         -Schedule for colonoscopy  -High-fiber diet     -Patient was given instructions about the colonoscopy prep     -Patient was explained about  the risks and benefits of the procedure  Risks including but not limited to bleeding, infection, perforation were explained in detail  Also explained about less than 100% sensitivity with the exam and other alternatives            Constipation  Appears to be physiologic and functional from irritable bowel syndrome     -advised about high-fiber diet and take MiraLax regularly    Dyspepsia  Possible from constipation  Rule out peptic ulcer disease or gastric erosions  Also consider celiac disease     -check celiac disease panel    -schedule for upper endoscopy    -Patient was explained about the lifestyle and dietary modifications  Advised to avoid fatty foods, chocolates, caffeine, alcohol and any other triggering foods  Avoid eating for at least 3 hours before going to bed

## 2018-07-06 NOTE — LETTER
July 6, 2018     Jarvis Pascal MD  [address]    Patient: Braden    YOB: 1970   Date of Visit: 7/6/2018       Dear Dr Michael Lockhart: Thank you for referring Rondanavi Kalyani to me for evaluation  Below are my notes for this consultation  If you have questions, please do not hesitate to call me  I look forward to following your patient along with you           Sincerely,        Aisha Seay MD        CC: No Recipients

## 2018-07-06 NOTE — ASSESSMENT & PLAN NOTE
Appears to be physiologic and functional from irritable bowel syndrome     -advised about high-fiber diet and take MiraLax regularly

## 2018-07-06 NOTE — ASSESSMENT & PLAN NOTE
Possible from constipation  Rule out peptic ulcer disease or gastric erosions  Also consider celiac disease     -check celiac disease panel    -schedule for upper endoscopy    -Patient was explained about the lifestyle and dietary modifications  Advised to avoid fatty foods, chocolates, caffeine, alcohol and any other triggering foods  Avoid eating for at least 3 hours before going to bed

## 2018-08-06 RX ORDER — VITAMIN B COMPLEX
1 CAPSULE ORAL DAILY
COMMUNITY
End: 2021-12-28 | Stop reason: ALTCHOICE

## 2018-08-06 NOTE — PRE-PROCEDURE INSTRUCTIONS
Pre-Surgery Instructions:   Medication Instructions    b complex vitamins capsule Patient was instructed by Physician and understands   Calcium Carb-Cholecalciferol (LIQUID CALCIUM WITH D3) 600-1000 MG-UNIT CAPS Patient was instructed by Physician and understands   ranitidine (ZANTAC) 150 MG capsule Patient was instructed by Physician and understands   RESTASIS MULTIDOSE 0 05 % ophthalmic emulsion Patient was instructed by Physician and understands   venlafaxine (EFFEXOR-XR) 150 mg 24 hr capsule Patient was instructed by Physician and understands

## 2018-08-07 ENCOUNTER — ANESTHESIA EVENT (OUTPATIENT)
Dept: GASTROENTEROLOGY | Facility: AMBULARY SURGERY CENTER | Age: 48
End: 2018-08-07
Payer: COMMERCIAL

## 2018-08-08 ENCOUNTER — HOSPITAL ENCOUNTER (OUTPATIENT)
Facility: AMBULARY SURGERY CENTER | Age: 48
Setting detail: OUTPATIENT SURGERY
Discharge: HOME | End: 2018-08-08
Attending: INTERNAL MEDICINE | Admitting: INTERNAL MEDICINE
Payer: COMMERCIAL

## 2018-08-08 ENCOUNTER — ANESTHESIA (OUTPATIENT)
Dept: GASTROENTEROLOGY | Facility: AMBULARY SURGERY CENTER | Age: 48
End: 2018-08-08
Payer: COMMERCIAL

## 2018-08-08 VITALS
OXYGEN SATURATION: 100 % | TEMPERATURE: 97.9 F | SYSTOLIC BLOOD PRESSURE: 116 MMHG | DIASTOLIC BLOOD PRESSURE: 61 MMHG | RESPIRATION RATE: 18 BRPM | HEART RATE: 72 BPM

## 2018-08-08 DIAGNOSIS — K59.00 CONSTIPATION, UNSPECIFIED CONSTIPATION TYPE: ICD-10-CM

## 2018-08-08 DIAGNOSIS — R10.10 UPPER ABDOMINAL PAIN: ICD-10-CM

## 2018-08-08 DIAGNOSIS — Z12.11 SCREEN FOR COLON CANCER: ICD-10-CM

## 2018-08-08 DIAGNOSIS — R10.13 DYSPEPSIA: ICD-10-CM

## 2018-08-08 LAB — EXT PREGNANCY TEST URINE: NEGATIVE

## 2018-08-08 PROCEDURE — 88305 TISSUE EXAM BY PATHOLOGIST: CPT | Performed by: PATHOLOGY

## 2018-08-08 PROCEDURE — 81025 URINE PREGNANCY TEST: CPT | Performed by: INTERNAL MEDICINE

## 2018-08-08 PROCEDURE — 88342 IMHCHEM/IMCYTCHM 1ST ANTB: CPT | Performed by: PATHOLOGY

## 2018-08-08 PROCEDURE — 88313 SPECIAL STAINS GROUP 2: CPT | Performed by: PATHOLOGY

## 2018-08-08 PROCEDURE — 43239 EGD BIOPSY SINGLE/MULTIPLE: CPT | Performed by: INTERNAL MEDICINE

## 2018-08-08 PROCEDURE — 45385 COLONOSCOPY W/LESION REMOVAL: CPT | Performed by: INTERNAL MEDICINE

## 2018-08-08 RX ORDER — PANTOPRAZOLE SODIUM 40 MG/1
40 TABLET, DELAYED RELEASE ORAL DAILY
Qty: 30 TABLET | Refills: 11 | Status: SHIPPED | OUTPATIENT
Start: 2018-08-08 | End: 2019-10-01 | Stop reason: ALTCHOICE

## 2018-08-08 RX ORDER — PROPOFOL 10 MG/ML
INJECTION, EMULSION INTRAVENOUS CONTINUOUS PRN
Status: DISCONTINUED | OUTPATIENT
Start: 2018-08-08 | End: 2018-08-08 | Stop reason: SURG

## 2018-08-08 RX ORDER — SODIUM CHLORIDE, SODIUM LACTATE, POTASSIUM CHLORIDE, CALCIUM CHLORIDE 600; 310; 30; 20 MG/100ML; MG/100ML; MG/100ML; MG/100ML
125 INJECTION, SOLUTION INTRAVENOUS CONTINUOUS
Status: DISCONTINUED | OUTPATIENT
Start: 2018-08-08 | End: 2018-08-08 | Stop reason: HOSPADM

## 2018-08-08 RX ORDER — PROPOFOL 10 MG/ML
INJECTION, EMULSION INTRAVENOUS AS NEEDED
Status: DISCONTINUED | OUTPATIENT
Start: 2018-08-08 | End: 2018-08-08 | Stop reason: SURG

## 2018-08-08 RX ADMIN — PROPOFOL 60 MG: 10 INJECTION, EMULSION INTRAVENOUS at 10:17

## 2018-08-08 RX ADMIN — PROPOFOL 100 MCG/KG/MIN: 10 INJECTION, EMULSION INTRAVENOUS at 10:17

## 2018-08-08 RX ADMIN — SODIUM CHLORIDE, SODIUM LACTATE, POTASSIUM CHLORIDE, AND CALCIUM CHLORIDE 125 ML/HR: .6; .31; .03; .02 INJECTION, SOLUTION INTRAVENOUS at 10:10

## 2018-08-08 NOTE — ANESTHESIA PREPROCEDURE EVALUATION
Review of Systems/Medical History  Patient summary reviewed  Chart reviewed  No history of anesthetic complications     Cardiovascular  Exercise tolerance (METS): >4,  Hyperlipidemia,    Pulmonary  Smoker ex-smoker  ,        GI/Hepatic    GERD ,             Endo/Other     GYN       Hematology   Musculoskeletal       Neurology   Psychology   Depression ,              Physical Exam    Airway    Mallampati score: II  TM Distance: >3 FB  Neck ROM: full     Dental   No notable dental hx     Cardiovascular  Cardiovascular exam normal    Pulmonary  Pulmonary exam normal     Other Findings        Anesthesia Plan  ASA Score- 2     Anesthesia Type- IV sedation with anesthesia with ASA Monitors  Additional Monitors:   Airway Plan:         Plan Factors-    Induction-     Postoperative Plan-     Informed Consent- Anesthetic plan and risks discussed with patient

## 2018-08-08 NOTE — OP NOTE
ESOPHAGOGASTRODUODENOSCOPY    PROCEDURE: EGD/ Biopsy    INDICATIONS: Dyspepsia    POST-OP DIAGNOSIS: See the impression below    SEDATION: Monitored anesthesia care, check anesthesia records    PHYSICAL EXAM:    Vitals:    08/08/18 0947   BP: 108/70   Pulse: 73   Resp: 18   Temp: 97 9 °F (36 6 °C)   SpO2: 100%    There is no height or weight on file to calculate BMI  General: NAD  Heart: S1 & S2 normal, RRR  Lungs: CTA, No rales or rhonchi  Abdomen: Soft, nontender, nondistended, good bowel sounds    CONSENT:  Informed consent was obtained for the procedure, including sedation after explaining the risks and benefits of the procedure  Risks including but not limited to bleeding, perforation, infection, aspiration were discussed in detail  Also explained about less than 100% sensitivity with the exam and other alternatives  PREPARATION:   EKG tracing, pulse oximetry, blood pressure were monitored throughout the procedure  Patient was identified by myself both verbally and by visual inspection of ID band  DESCRIPTION:   Patient was placed in the left lateral decubitus position and was sedated with the above medication  The gastroscope was introduced in to the oropharynx and the esophagus was intubated under direct visualization  Scope was passed down the esophagus up to 2nd part of the duodenum  A careful inspection was made as the gastroscope was withdrawn, including a retroflexed view of the stomach; findings and interventions are described below  FINDINGS:    #1  Esophagus and GEJ- tiny columnar mucosal extension at the GE junction was biopsied    #2  Stomach- few small erosions in the antrum  Biopsies done to check for H pylori  #3  Duodenum- erythema and inflammation in the gastric bulb and postbulbar area         IMPRESSIONS:      As above    RECOMMENDATIONS:     Check pathology    Change ranitidine to Protonix    COMPLICATIONS:  None; patient tolerated the procedure well  DISPOSITION: PACU           CONDITION: Stable

## 2018-08-08 NOTE — OP NOTE
COLONOSCOPY    PROCEDURE: Colonoscopy/ Polypectomy (Snare Cautery)    INDICATIONS: Screening for Colon Cancer    POST-OP DIAGNOSIS: See the impression below    SEDATION: Monitored anesthesia care, check anesthesia records    PHYSICAL EXAM:    /70   Pulse 73   Temp 97 9 °F (36 6 °C) (Tympanic)   Resp 18   LMP 06/20/2018 (Approximate)   SpO2 100%   There is no height or weight on file to calculate BMI  General: NAD  Heart: S1 & S2 normal, RRR  Lungs: CTA, No rales or rhonchi  Abdomen: Soft, nontender, nondistended, good bowel sounds    CONSENT:  Informed consent was obtained for the procedure, including sedation after explaining the risks and benefits of the procedure  Risks including but not limited to bleeding, perforation, infection, aspiration were discussed in detail  Also explained about less than 100%$ sensitivity with the exam and other alternatives  PREPARATION:   EKG tracing, pulse oximetry, blood pressure were monitored throughout the procedure  Patient was identified by myself both verbally and by visual inspection of ID band  DESCRIPTION:   Patient was placed in the left lateral decubitus position and was sedated with the above medication  Digital rectal examination was performed  The colonoscope was introduced in to the anal canal and advanced up to cecum, which was identified by the appendiceal orifice and IC valve  A careful inspection was made as the colonoscope was withdrawn, including a retroflexed view of the rectum; findings and interventions are described below  Appropriate photodocumentation was obtained  The quality of the colonic preparation was adequate  FINDINGS:    1  Cecum and ileocecal valve-normal mucosa    2  Rectum-8 mm polyp was removed with snare cautery         IMPRESSIONS:      As above    RECOMMENDATIONS:    Check pathology    COMPLICATIONS:  None; patient tolerated the procedure well      DISPOSITION: PACU           CONDITION: Stable

## 2018-08-08 NOTE — H&P
History and Physical -  [facility]  Sobeida Duran 46 y o  female MRN: [id number]        HPI: 68-year-old female with no significant past medical history was referred for colonoscopy  Patient never had colonoscopy in the past   She reports having issues with bloating and gassiness in her stomach  She has problems with her bowels with episodes of constipation  Denies any blood or mucus in the stool  Good appetite, no recent weight loss  Denies any heartburn acid reflux  She was started on Zantac a month ago without any significant difference  Historical Information   Past Medical History:   Diagnosis Date    Depression     Dry eye     GERD (gastroesophageal reflux disease)     Oral contraceptive prescribed     last assessed - [id number]     Past Surgical History:   Procedure Laterality Date    NO PAST SURGERIES       Social History   History   Alcohol Use    Yes     Comment: occasional     History   Drug Use No     History   Smoking Status    Former Smoker   Smokeless Tobacco    Never Used     Family History   Problem Relation Age of Onset    Diabetes Mother     Hypertension Mother     Adrenal disorder Brother         Adrenal insufficiency       Meds/Allergies     Prescriptions Prior to Admission   Medication    b complex vitamins capsule    Calcium Carb-Cholecalciferol (LIQUID CALCIUM WITH D3) 600-1000 MG-UNIT CAPS    RESTASIS MULTIDOSE 0 05 % ophthalmic emulsion    venlafaxine (EFFEXOR-XR) 150 mg 24 hr capsule    Na Sulfate-K Sulfate-Mg Sulf (SUPREP BOWEL PREP KIT) 17 5-3 13-1 6 GM/180ML SOLN    ranitidine (ZANTAC) 150 MG capsule       No Known Allergies    Objective     Blood pressure 108/70, pulse 73, temperature 97 9 °F (36 6 °C), temperature source Tympanic, resp  rate 18, last menstrual period 06/20/2018, SpO2 100 %      PHYSICAL EXAM:    Gen: NAD  CV: S1 & S2 normal, RRR  CHEST: Clear to auscultate  ABD: soft, NT/ND, good bowel sounds  EXT: no edema    ASSESSMENT: Dyspepsia, screening    PLAN:    EGD and colonoscopy

## 2018-08-14 ENCOUNTER — TELEPHONE (OUTPATIENT)
Dept: GASTROENTEROLOGY | Facility: CLINIC | Age: 48
End: 2018-08-14

## 2018-08-14 NOTE — TELEPHONE ENCOUNTER
----- Message from Mike Lockett MD sent at 8/14/2018 11:06 AM EDT -----  Gastric biopsies are benign and negative for H pylori  Patient to continue PPI    Colon polyps removed came back as precancerous tubular adenoma  Next colonoscopy in 3 years  Patient to call our office for any GI symptoms      Office visit with PA in couple of months

## 2018-08-14 NOTE — TELEPHONE ENCOUNTER
Attempted to contact pt via telephone, lmom for pt to call office  Letter also sent    Recall and hm set

## 2019-01-10 DIAGNOSIS — H04.129 DRY EYE: Primary | ICD-10-CM

## 2019-02-03 ENCOUNTER — TELEPHONE (OUTPATIENT)
Dept: FAMILY MEDICINE CLINIC | Facility: CLINIC | Age: 49
End: 2019-02-03

## 2019-02-03 DIAGNOSIS — F41.9 ANXIETY AND DEPRESSION: Primary | ICD-10-CM

## 2019-02-03 DIAGNOSIS — F32.A ANXIETY AND DEPRESSION: Primary | ICD-10-CM

## 2019-02-03 RX ORDER — VENLAFAXINE 75 MG/1
75 TABLET ORAL 2 TIMES DAILY
Qty: 30 TABLET | Refills: 1 | Status: SHIPPED | OUTPATIENT
Start: 2019-02-03 | End: 2019-02-15 | Stop reason: SDUPTHER

## 2019-02-03 NOTE — TELEPHONE ENCOUNTER
Spoke w pt --having ongoing anx/dep sxs-would like to try alt med-Trintellix--will taper off the Effexor and change to new med-rx sent to [facility]--pt will follow-up wme at yearly physical-  Sooner prn

## 2019-02-15 DIAGNOSIS — F32.A ANXIETY AND DEPRESSION: ICD-10-CM

## 2019-02-15 DIAGNOSIS — F41.9 ANXIETY AND DEPRESSION: ICD-10-CM

## 2019-02-16 RX ORDER — VENLAFAXINE 75 MG/1
75 TABLET ORAL 2 TIMES DAILY
Qty: 30 TABLET | Refills: 1 | Status: SHIPPED | OUTPATIENT
Start: 2019-02-16 | End: 2019-05-15 | Stop reason: ALTCHOICE

## 2019-02-19 NOTE — TELEPHONE ENCOUNTER
Pt would like to know if the prior aut has been done on the med trintellix   Pt has been without meds for 2 weeks

## 2019-05-15 DIAGNOSIS — F41.8 DEPRESSION WITH ANXIETY: Primary | ICD-10-CM

## 2019-06-19 DIAGNOSIS — F32.A ANXIETY AND DEPRESSION: ICD-10-CM

## 2019-06-19 DIAGNOSIS — F41.9 ANXIETY AND DEPRESSION: ICD-10-CM

## 2019-06-19 RX ORDER — VORTIOXETINE 10 MG/1
TABLET, FILM COATED ORAL
Qty: 30 TABLET | Refills: 0 | Status: SHIPPED | OUTPATIENT
Start: 2019-06-19 | End: 2019-08-02 | Stop reason: SDUPTHER

## 2019-07-23 DIAGNOSIS — Z12.39 SCREENING FOR MALIGNANT NEOPLASM OF BREAST: Primary | ICD-10-CM

## 2019-08-02 DIAGNOSIS — F41.9 ANXIETY AND DEPRESSION: ICD-10-CM

## 2019-08-02 DIAGNOSIS — F32.A ANXIETY AND DEPRESSION: ICD-10-CM

## 2019-08-02 NOTE — TELEPHONE ENCOUNTER
Dr Lina Olgiun    Patient is requesting refill trintellix med sent to [address] [place] [address]

## 2019-08-07 ENCOUNTER — TELEPHONE (OUTPATIENT)
Dept: FAMILY MEDICINE CLINIC | Facility: CLINIC | Age: 49
End: 2019-08-07

## 2019-08-07 NOTE — TELEPHONE ENCOUNTER
Please verify dosage  Latest order was for 10mg, I did prior auth for 10mg   Pharmacy says patient takes 20mg

## 2019-08-08 DIAGNOSIS — F41.8 DEPRESSION WITH ANXIETY: ICD-10-CM

## 2019-08-08 NOTE — TELEPHONE ENCOUNTER
Pt was increased to 20mg at one time--please call her to confirm if she is still taking that dose and I will send in new rx -thanks

## 2019-09-30 DIAGNOSIS — F41.8 DEPRESSION WITH ANXIETY: ICD-10-CM

## 2019-10-01 ENCOUNTER — OFFICE VISIT (OUTPATIENT)
Dept: FAMILY MEDICINE CLINIC | Facility: CLINIC | Age: 49
End: 2019-10-01
Payer: COMMERCIAL

## 2019-10-01 VITALS
WEIGHT: 134.2 LBS | HEART RATE: 68 BPM | SYSTOLIC BLOOD PRESSURE: 108 MMHG | DIASTOLIC BLOOD PRESSURE: 72 MMHG | HEIGHT: 64 IN | BODY MASS INDEX: 22.91 KG/M2 | TEMPERATURE: 97.9 F | RESPIRATION RATE: 12 BRPM

## 2019-10-01 DIAGNOSIS — Z12.83 SKIN CANCER SCREENING: ICD-10-CM

## 2019-10-01 DIAGNOSIS — K21.00 GASTROESOPHAGEAL REFLUX DISEASE WITH ESOPHAGITIS: ICD-10-CM

## 2019-10-01 DIAGNOSIS — E78.5 BORDERLINE HYPERLIPIDEMIA: ICD-10-CM

## 2019-10-01 DIAGNOSIS — R53.83 OTHER FATIGUE: ICD-10-CM

## 2019-10-01 DIAGNOSIS — Z00.00 PHYSICAL EXAM: Primary | ICD-10-CM

## 2019-10-01 DIAGNOSIS — Z12.39 BREAST CANCER SCREENING: ICD-10-CM

## 2019-10-01 DIAGNOSIS — F41.8 DEPRESSION WITH ANXIETY: ICD-10-CM

## 2019-10-01 DIAGNOSIS — Z13.820 SCREENING FOR OSTEOPOROSIS: ICD-10-CM

## 2019-10-01 DIAGNOSIS — Z82.62 FAMILY HISTORY OF OSTEOPOROSIS: ICD-10-CM

## 2019-10-01 DIAGNOSIS — R73.01 ABNORMAL FASTING GLUCOSE: ICD-10-CM

## 2019-10-01 LAB
SL AMB  POCT GLUCOSE, UA: NORMAL
SL AMB LEUKOCYTE ESTERASE,UA: NORMAL
SL AMB POCT BILIRUBIN,UA: NORMAL
SL AMB POCT BLOOD,UA: NORMAL
SL AMB POCT CLARITY,UA: CLEAR
SL AMB POCT COLOR,UA: YELLOW
SL AMB POCT KETONES,UA: NORMAL
SL AMB POCT NITRITE,UA: NORMAL
SL AMB POCT PH,UA: 8
SL AMB POCT SPECIFIC GRAVITY,UA: 1.01
SL AMB POCT URINE PROTEIN: NORMAL
SL AMB POCT UROBILINOGEN: NORMAL

## 2019-10-01 PROCEDURE — 81003 URINALYSIS AUTO W/O SCOPE: CPT | Performed by: FAMILY MEDICINE

## 2019-10-01 PROCEDURE — 99396 PREV VISIT EST AGE 40-64: CPT | Performed by: FAMILY MEDICINE

## 2019-10-01 RX ORDER — FAMOTIDINE 20 MG/1
20 TABLET, FILM COATED ORAL 2 TIMES DAILY
Qty: 180 TABLET | Refills: 1 | Status: SHIPPED | OUTPATIENT
Start: 2019-10-01 | End: 2021-05-19 | Stop reason: ALTCHOICE

## 2019-10-06 NOTE — PROGRESS NOTES
[facility] HEALTH MAINTENANCE OFFICE VISIT  [facility] - [facility]    NAME: Nohemy Mtichell  AGE: 44 y o  SEX: female  : 1970     DATE: 10/6/2019    Assessment and Plan     1  Physical exam  -     Mammo screening bilateral w cad; Future; Expected date: 10/01/2019  -     POCT urine dip auto non-scope  -     Amylase; Future  -     Lipase; Future  -     Lipid Panel with Direct LDL reflex; Future  -     CBC; Future  -     Magnesium; Future  -     Comprehensive metabolic panel; Future  -     TSH, 3rd generation; Future  -     Vitamin D 25 hydroxy; Future    2  Breast cancer screening  Comments:  mammo ordered  Orders:  -     Mammo screening bilateral w cad; Future; Expected date: 10/01/2019  -     Mammo screening bilateral w 3d & cad; Future; Expected date: 10/01/2019    3  Screening for osteoporosis  Comments:  DEXA ordered  Orders:  -     DXA bone density spine hip and pelvis; Future; Expected date: 10/01/2019  -     Vitamin D 25 hydroxy; Future    4  Borderline hyperlipidemia  Comments:  check lab  Orders:  -     Amylase; Future  -     Lipase; Future  -     Lipid Panel with Direct LDL reflex; Future  -     Comprehensive metabolic panel; Future    5  Abnormal fasting glucose  Comments:  check lab  Orders:  -     Comprehensive metabolic panel; Future    6  Depression with anxiety  Comments:  r Trintellix--+improvement w med on increased dose  Orders:  -     Comprehensive metabolic panel; Future    7  Other fatigue  Comments:  check labs, daily mvi, adeq  hydration  Orders:  -     CBC; Future  -     Magnesium; Future  -     TSH, 3rd generation; Future    8  Family history of osteoporosis  -     Vitamin D 25 hydroxy; Future    9  Gastroesophageal reflux disease with esophagitis  Comments:  trial H2-blocker Famotidine to replace PPI-Pantoprazole  Orders:  -     famotidine (PEPCID) 20 mg tablet; Take 1 tablet (20 mg total) by mouth 2 (two) times a day    10   Skin cancer screening  Comments:  Derm ref given  Orders:  -     Ambulatory referral to Dermatology; Future            · Patient Counseling:   · Nutrition: Stressed importance of a well balanced diet, moderation of sodium/saturated fat, caloric balance and sufficient intake of fiber  · Exercise: Stressed the importance of regular exercise with a goal of 150 minutes per week  · Dental Health: Discussed daily flossing and brushing and regular dental visits     · Immunizations reviewed: Risks and Benefits discussed will get flu shot thru workplace  · Discussed benefits of:  Colon Cancer Screening and Mammogram  mammogram ordered today  Had colonoscopy w [id number] Stacy Rubalcava due 2021  Had normal Pap 5/2017   BMI Counseling: Body mass index is 23 4 kg/m²  Discussed with patient's BMI with her  The BMI is normal           Chief Complaint     Chief Complaint   Patient presents with    Physical Exam       History of Present Illness     HPI    Well Adult Physical   Patient here for a comprehensive physical exam       Diet and Physical Activity  Diet: well balanced diet  Exercise: frequently      Depression Screen  PHQ-9 Depression Screening    PHQ-9:    Frequency of the following problems over the past two weeks:               General Health  Hearing: Normal:  bilateral  Vision: no vision problems  Dental: regular dental visits    Reproductive Health  perimenopausal [id number] (one miscarriage)--2 sons in [address] t suffer w anxiety/depression-mainly work related stress and worry over well-being of family  Inc dose of Trintellix helping    The following portions of the patient's history were reviewed and updated as appropriate: allergies, current medications, past family history, past medical history, past social history, past surgical history and problem list     Review of Systems     Review of Systems   Constitutional: Positive for fatigue  Respiratory: Negative  Cardiovascular: Negative  Gastrointestinal:        Bloating, gerd   Musculoskeletal: Positive for arthralgias  Neurological: Negative  Psychiatric/Behavioral: Positive for sleep disturbance  The patient is nervous/anxious  Past Medical History     Past Medical History:   Diagnosis Date    Celiac disease     Depression     Dry eye     GERD (gastroesophageal reflux disease)     Oral contraceptive prescribed     last assessed - [id number]       Past Surgical History     Past Surgical History:   Procedure Laterality Date    NO PAST SURGERIES      [place] COLONOSCOPY FLX DX W/COLLJ SPEC WHEN PFRMD N/A 8/8/2018    Procedure: EGD AND COLONOSCOPY;  Surgeon: Stan Jacobo MD;  Location: [place] [address] GI LAB;   Service: Gastroenterology       Social History     Social History     Socioeconomic History    Marital status: /Civil Union     Spouse name: None    Number of children: None    Years of education: None    Highest education level: None   Occupational History    Occupation: Active   Social Needs    Financial resource strain: None    Food insecurity:     Worry: None     Inability: None    Transportation needs:     Medical: None     Non-medical: None   Tobacco Use    Smoking status: Former Smoker    Smokeless tobacco: Never Used   Substance and Sexual Activity    Alcohol use: Yes     Comment: occasional    Drug use: No    Sexual activity: Yes   Lifestyle    Physical activity:     Days per week: None     Minutes per session: None    Stress: None   Relationships    Social connections:     Talks on phone: None     Gets together: None     Attends Protestant service: None     Active member of club or organization: None     Attends meetings of clubs or organizations: None     Relationship status: None    Intimate partner violence:     Fear of current or ex partner: None     Emotionally abused: None     Physically abused: None     Forced sexual activity: None   Other Topics Concern    None   Social History Narrative    None       Family History     Family History   Problem Relation Age of Onset    Diabetes Mother     Hypertension Mother     Adrenal disorder Brother         Adrenal insufficiency       Current Medications       Current Outpatient Medications:     b complex vitamins capsule, Take 1 capsule by mouth daily, Disp: , Rfl:     Calcium Carb-Cholecalciferol (LIQUID CALCIUM WITH D3) 600-1000 MG-UNIT CAPS, Take by mouth, Disp: , Rfl:     RESTASIS MULTIDOSE 0 05 % ophthalmic emulsion, Administer 1 drop to both eyes every 12 (twelve) hours, Disp: 0 4 mL, Rfl: 0    Vortioxetine HBr (TRINTELLIX) 20 MG tablet, Take 1 tablet (20 mg total) by mouth daily, Disp: 90 tablet, Rfl: 1    famotidine (PEPCID) 20 mg tablet, Take 1 tablet (20 mg total) by mouth 2 (two) times a day, Disp: 180 tablet, Rfl: 1    Na Sulfate-K Sulfate-Mg Sulf (SUPREP BOWEL PREP KIT) 17 5-3 13-1 6 GM/180ML SOLN, Take 2 Bottles by mouth see administration instructions Please follow the instructions from the office (Patient not taking: Reported on 10/1/2019), Disp: 2 Bottle, Rfl: 0     Allergies     No Known Allergies    Objective     /72 (BP Location: Left arm, Patient Position: Sitting, Cuff Size: Standard)   Pulse 68   Temp 97 9 °F (36 6 °C) (Tympanic)   Resp 12   Ht 5' 3 5" (1 613 m)   Wt 60 9 kg (134 lb 3 2 oz)   BMI 23 40 kg/m²     UA neg in office today     Physical Exam   Constitutional: She is oriented to person, place, and time  She appears well-developed and well-nourished  No distress  HENT:   Head: Normocephalic and atraumatic  Nose: Nose normal    Mouth/Throat: No oropharyngeal exudate  Eyes: Pupils are equal, round, and reactive to light  Conjunctivae and EOM are normal  No scleral icterus  Neck: Normal range of motion  Neck supple  Cardiovascular: Regular rhythm, normal heart sounds and intact distal pulses  No murmur heard  Pulmonary/Chest: Effort normal and breath sounds normal  No respiratory distress     Abdominal: Soft  Bowel sounds are normal  There is no tenderness  There is no rebound and no guarding  Musculoskeletal: Normal range of motion  She exhibits no tenderness  Neurological: She is alert and oriented to person, place, and time  No cranial nerve deficit  Skin: Skin is warm and dry  Psychiatric: She has a normal mood and affect  Nursing note and vitals reviewed            Marquis Kevin MD  [address]

## 2019-10-21 DIAGNOSIS — F41.8 DEPRESSION WITH ANXIETY: ICD-10-CM

## 2020-01-17 LAB
25(OH)D3 SERPL-MCNC: 33 NG/ML (ref 30–100)
ALBUMIN SERPL-MCNC: 4.3 G/DL (ref 3.6–5.1)
ALBUMIN/GLOB SERPL: 2.5 (CALC) (ref 1–2.5)
ALP SERPL-CCNC: 46 U/L (ref 33–130)
ALT SERPL-CCNC: 10 U/L (ref 6–29)
AMYLASE SERPL-CCNC: 35 U/L (ref 21–101)
AST SERPL-CCNC: 18 U/L (ref 10–35)
BASOPHILS # BLD AUTO: 88 CELLS/UL (ref 0–200)
BASOPHILS NFR BLD AUTO: 1.6 %
BILIRUB SERPL-MCNC: 0.9 MG/DL (ref 0.2–1.2)
BUN SERPL-MCNC: 19 MG/DL (ref 7–25)
BUN/CREAT SERPL: ABNORMAL (CALC) (ref 6–22)
CALCIUM SERPL-MCNC: 9.5 MG/DL (ref 8.6–10.4)
CHLORIDE SERPL-SCNC: 105 MMOL/L (ref 98–110)
CHOLEST SERPL-MCNC: 204 MG/DL
CHOLEST/HDLC SERPL: 2.8 (CALC)
CO2 SERPL-SCNC: 27 MMOL/L (ref 20–32)
CREAT SERPL-MCNC: 0.68 MG/DL (ref 0.5–1.05)
EOSINOPHIL # BLD AUTO: 209 CELLS/UL (ref 15–500)
EOSINOPHIL NFR BLD AUTO: 3.8 %
ERYTHROCYTE [DISTWIDTH] IN BLOOD BY AUTOMATED COUNT: 11.7 % (ref 11–15)
GLOBULIN SER CALC-MCNC: 1.7 G/DL (CALC) (ref 1.9–3.7)
GLUCOSE SERPL-MCNC: 89 MG/DL (ref 65–99)
HCT VFR BLD AUTO: 40.4 % (ref 35–45)
HDLC SERPL-MCNC: 72 MG/DL
HGB BLD-MCNC: 13.5 G/DL (ref 11.7–15.5)
LDLC SERPL CALC-MCNC: 118 MG/DL (CALC)
LIPASE SERPL-CCNC: 57 U/L (ref 7–60)
LYMPHOCYTES # BLD AUTO: 1694 CELLS/UL (ref 850–3900)
LYMPHOCYTES NFR BLD AUTO: 30.8 %
MAGNESIUM SERPL-MCNC: 2 MG/DL (ref 1.5–2.5)
MCH RBC QN AUTO: 29.8 PG (ref 27–33)
MCHC RBC AUTO-ENTMCNC: 33.4 G/DL (ref 32–36)
MCV RBC AUTO: 89.2 FL (ref 80–100)
MONOCYTES # BLD AUTO: 556 CELLS/UL (ref 200–950)
MONOCYTES NFR BLD AUTO: 10.1 %
NEUTROPHILS # BLD AUTO: 2954 CELLS/UL (ref 1500–7800)
NEUTROPHILS NFR BLD AUTO: 53.7 %
NONHDLC SERPL-MCNC: 132 MG/DL (CALC)
PLATELET # BLD AUTO: 315 THOUSAND/UL (ref 140–400)
PMV BLD REES-ECKER: 10.2 FL (ref 7.5–12.5)
POTASSIUM SERPL-SCNC: 4.5 MMOL/L (ref 3.5–5.3)
PROT SERPL-MCNC: 6 G/DL (ref 6.1–8.1)
RBC # BLD AUTO: 4.53 MILLION/UL (ref 3.8–5.1)
SL AMB EGFR AFRICAN AMERICAN: 117 ML/MIN/1.73M2
SL AMB EGFR NON AFRICAN AMERICAN: 101 ML/MIN/1.73M2
SODIUM SERPL-SCNC: 139 MMOL/L (ref 135–146)
TRIGL SERPL-MCNC: 46 MG/DL
TSH SERPL-ACNC: 1.08 MIU/L
WBC # BLD AUTO: 5.5 THOUSAND/UL (ref 3.8–10.8)

## 2020-01-27 ENCOUNTER — TELEPHONE (OUTPATIENT)
Dept: FAMILY MEDICINE CLINIC | Facility: CLINIC | Age: 50
End: 2020-01-27

## 2020-01-27 NOTE — TELEPHONE ENCOUNTER
[facility] , mailed results  tc/cma----- Message from Kam Vazquez MD sent at 1/25/2020 11:16 AM EST -----  Please inform labs overall within nrmal--total chol borderline high but good HDL-please also mail copy t pt-thanks

## 2020-01-28 ENCOUNTER — TELEPHONE (OUTPATIENT)
Dept: FAMILY MEDICINE CLINIC | Facility: CLINIC | Age: 50
End: 2020-01-28

## 2020-01-28 NOTE — TELEPHONE ENCOUNTER
----- Message from Miguel Mckeon MD sent at 1/25/2020 11:16 AM EST -----  Please inform labs overall within nrmal--total chol borderline high but good HDL-please also mail copy t pt-thanks

## 2020-12-21 DIAGNOSIS — F41.8 DEPRESSION WITH ANXIETY: ICD-10-CM

## 2021-01-24 ENCOUNTER — TELEPHONE (OUTPATIENT)
Dept: OTHER | Facility: OTHER | Age: 51
End: 2021-01-24

## 2021-05-19 ENCOUNTER — OFFICE VISIT (OUTPATIENT)
Dept: FAMILY MEDICINE CLINIC | Facility: CLINIC | Age: 51
End: 2021-05-19
Payer: COMMERCIAL

## 2021-05-19 VITALS
HEIGHT: 64 IN | RESPIRATION RATE: 14 BRPM | SYSTOLIC BLOOD PRESSURE: 100 MMHG | BODY MASS INDEX: 23.15 KG/M2 | WEIGHT: 135.6 LBS | TEMPERATURE: 97.6 F | DIASTOLIC BLOOD PRESSURE: 72 MMHG | HEART RATE: 62 BPM

## 2021-05-19 DIAGNOSIS — Z12.9 ENCOUNTER FOR SCREENING FOR MALIGNANT NEOPLASM: ICD-10-CM

## 2021-05-19 DIAGNOSIS — Z00.00 PHYSICAL EXAM: Primary | ICD-10-CM

## 2021-05-19 DIAGNOSIS — Z86.010 HISTORY OF COLON POLYPS: ICD-10-CM

## 2021-05-19 DIAGNOSIS — F41.8 DEPRESSION WITH ANXIETY: ICD-10-CM

## 2021-05-19 DIAGNOSIS — Z13.820 SCREENING FOR OSTEOPOROSIS: ICD-10-CM

## 2021-05-19 DIAGNOSIS — E78.5 BORDERLINE HYPERLIPIDEMIA: ICD-10-CM

## 2021-05-19 DIAGNOSIS — R30.0 DYSURIA: ICD-10-CM

## 2021-05-19 DIAGNOSIS — Z12.4 CERVICAL CANCER SCREENING: ICD-10-CM

## 2021-05-19 DIAGNOSIS — K21.00 GASTROESOPHAGEAL REFLUX DISEASE WITH ESOPHAGITIS, UNSPECIFIED WHETHER HEMORRHAGE: ICD-10-CM

## 2021-05-19 DIAGNOSIS — K90.0 CELIAC DISEASE: ICD-10-CM

## 2021-05-19 LAB
SL AMB  POCT GLUCOSE, UA: NORMAL
SL AMB LEUKOCYTE ESTERASE,UA: NORMAL
SL AMB POCT BILIRUBIN,UA: NORMAL
SL AMB POCT BLOOD,UA: NORMAL
SL AMB POCT CLARITY,UA: CLEAR
SL AMB POCT COLOR,UA: NORMAL
SL AMB POCT KETONES,UA: NORMAL
SL AMB POCT NITRITE,UA: NORMAL
SL AMB POCT PH,UA: 8
SL AMB POCT SPECIFIC GRAVITY,UA: 1
SL AMB POCT URINE PROTEIN: NORMAL
SL AMB POCT UROBILINOGEN: NORMAL

## 2021-05-19 PROCEDURE — 3008F BODY MASS INDEX DOCD: CPT | Performed by: FAMILY MEDICINE

## 2021-05-19 PROCEDURE — 99396 PREV VISIT EST AGE 40-64: CPT | Performed by: FAMILY MEDICINE

## 2021-05-19 PROCEDURE — 1036F TOBACCO NON-USER: CPT | Performed by: FAMILY MEDICINE

## 2021-05-19 PROCEDURE — 81003 URINALYSIS AUTO W/O SCOPE: CPT | Performed by: FAMILY MEDICINE

## 2021-05-19 RX ORDER — FLUTICASONE PROPIONATE 50 MCG
SPRAY, SUSPENSION (ML) NASAL
COMMUNITY
Start: 2021-03-09 | End: 2021-12-28 | Stop reason: ALTCHOICE

## 2021-05-19 RX ORDER — PANTOPRAZOLE SODIUM 40 MG/1
40 TABLET, DELAYED RELEASE ORAL
Qty: 90 TABLET | Refills: 3 | Status: SHIPPED | OUTPATIENT
Start: 2021-05-19 | End: 2021-12-28 | Stop reason: ALTCHOICE

## 2021-05-19 RX ORDER — LIFITEGRAST 50 MG/ML
SOLUTION/ DROPS OPHTHALMIC
COMMUNITY
Start: 2021-04-02 | End: 2021-12-28

## 2021-05-21 LAB
CYTOLOGIST CVX/VAG CYTO: NORMAL
DX ICD CODE: NORMAL
OTHER STN SPEC: NORMAL
OTHER STN SPEC: NORMAL
PATH REPORT.FINAL DX SPEC: NORMAL
SL AMB NOTE:: NORMAL
SL AMB SPECIMEN ADEQUACY: NORMAL
SL AMB TEST METHODOLOGY: NORMAL

## 2021-05-30 PROBLEM — Z12.4 CERVICAL CANCER SCREENING: Status: ACTIVE | Noted: 2021-05-30

## 2021-05-30 PROBLEM — Z13.820 SCREENING FOR OSTEOPOROSIS: Status: ACTIVE | Noted: 2018-07-06

## 2021-05-30 PROBLEM — Z86.0100 HISTORY OF COLON POLYPS: Status: ACTIVE | Noted: 2021-05-30

## 2021-05-30 PROBLEM — K90.0 CELIAC DISEASE: Status: ACTIVE | Noted: 2021-05-30

## 2021-05-30 PROBLEM — Z86.010 HISTORY OF COLON POLYPS: Status: ACTIVE | Noted: 2021-05-30

## 2021-05-30 PROBLEM — K21.00 GASTROESOPHAGEAL REFLUX DISEASE WITH ESOPHAGITIS: Status: ACTIVE | Noted: 2021-05-30

## 2021-05-31 NOTE — PROGRESS NOTES
[facility] HEALTH MAINTENANCE OFFICE VISIT  [facility] - [facility]    NAME: Christianne Rivera  AGE: 44 y o  SEX: female  : 1970     DATE: 2021    Assessment and Plan     1  Physical exam  -     Amylase; Future  -     Lipase; Future  -     CBC; Future  -     Lipid Panel with Direct LDL reflex; Future  -     Comprehensive metabolic panel; Future  -     TSH, 3rd generation; Future    2  Encounter for screening for malignant neoplasm  -     Mammo screening bilateral w 3d & cad; Future; Expected date: 2021    3  Dysuria  -     POCT urine dip auto non-scope    4  Borderline hyperlipidemia  -     Amylase; Future  -     Lipase; Future  -     Lipid Panel with Direct LDL reflex; Future  -     TSH, 3rd generation; Future    5  Gastroesophageal reflux disease with esophagitis, unspecified whether hemorrhage  -     Amylase; Future  -     Lipase; Future  -     Lipid Panel with Direct LDL reflex; Future  -     Comprehensive metabolic panel; Future  -     Ambulatory referral to Gastroenterology; Future  -     pantoprazole (PROTONIX) 40 mg tablet; Take 1 tablet (40 mg total) by mouth daily before breakfast    6  Celiac disease  -     Ambulatory referral to Gastroenterology; Future    7  History of colon polyps  -     Ambulatory referral to Gastroenterology; Future    8  Screening for osteoporosis  -     DXA bone density spine hip and pelvis; Future; Expected date: 2021    9  Cervical cancer screening  -     Liquid-based pap, screening    10  Depression with anxiety  Assessment & Plan:  Cont  Trintellix  Check labs  T/c medication adjustment pending lab results  11   BMI 23 0-23 9, adult      · Patient Counseling:   · Nutrition: Stressed importance of a well balanced diet, moderation of sodium/saturated fat, caloric balance and sufficient intake of fiber  · Exercise: Stressed the importance of regular exercise with a goal of 150 minutes per week  · Dental Health: Discussed daily flossing and brushing and regular dental visits   · Sexuality: Discussed sexually transmitted infections, use of condoms and prevention of unintended pregnancy  · Alcohol Use:  Recommended moderation of alcohol intake  · Injury Prevention: Discussed Safety Belts, Safety Helmets, and Smoke Detectors    · Immunizations reviewed: Risks and Benefits discussed  · Discussed benefits of:  Colon Cancer Screening, Mammogram , Cervical Cancer screening and Screening labs   BMI Counseling: Body mass index is 23 46 kg/m²  Discussed with patient's BMI with her  The BMI is at goal         Chief Complaint     Chief Complaint   Patient presents with    Physical Exam     pap, discuss menopause        History of Present Illness     HPI    Well Adult Physical   Patient here for a comprehensive physical exam       Diet and Physical Activity  Diet: well balanced diet  Exercise: intermittently      Depression Screen  PHQ-9 Depression Screening    PHQ-9:   Frequency of the following problems over the past two weeks:              General Health  Hearing: Normal:  bilateral  Vision: goes for regular eye exams  Dental: regular dental visits    Reproductive Health  due for pap today      The following portions of the patient's history were reviewed and updated as appropriate: allergies, current medications, past family history, past medical history, past social history, past surgical history and problem list     Review of Systems     Review of Systems   Constitutional: Positive for fatigue  Respiratory: Negative  Cardiovascular: Negative  Gastrointestinal:        Bloating, gerd   Musculoskeletal: Positive for arthralgias  Skin: Negative  Allergic/Immunologic: Positive for environmental allergies  Neurological: Negative  Psychiatric/Behavioral: Positive for decreased concentration, dysphoric mood and sleep disturbance  Negative for confusion and hallucinations  The patient is nervous/anxious          Past Medical History     Past Medical History:   Diagnosis Date    Celiac disease     Celiac disease 2019    Depression     Dry eye     GERD (gastroesophageal reflux disease)     Oral contraceptive prescribed     last assessed - [id number]       Past Surgical History     Past Surgical History:   Procedure Laterality Date    EAR SURGERY Bilateral 12/2020    NO PAST SURGERIES      [place] COLONOSCOPY FLX DX W/COLLJ SPEC WHEN PFRMD N/A 8/8/2018    Procedure: EGD AND COLONOSCOPY;  Surgeon: Ajay Sheehan MD;  Location: [place] [address] GI LAB;   Service: Gastroenterology       Social History     Social History     Socioeconomic History    Marital status: /Civil Union     Spouse name: None    Number of children: None    Years of education: None    Highest education level: None   Occupational History    Occupation: Active   Social Needs    Financial resource strain: None    Food insecurity     Worry: None     Inability: None    Transportation needs     Medical: None     Non-medical: None   Tobacco Use    Smoking status: Former Smoker    Smokeless tobacco: Never Used   Substance and Sexual Activity    Alcohol use: Yes     Comment: occasional    Drug use: No    Sexual activity: Yes   Lifestyle    Physical activity     Days per week: None     Minutes per session: None    Stress: None   Relationships    Social connections     Talks on phone: None     Gets together: None     Attends Alevism service: None     Active member of club or organization: None     Attends meetings of clubs or organizations: None     Relationship status: None    Intimate partner violence     Fear of current or ex partner: None     Emotionally abused: None     Physically abused: None     Forced sexual activity: None   Other Topics Concern    None   Social History Narrative    None       Family History     Family History   Problem Relation Age of Onset    Diabetes Mother     Hypertension Mother     Adrenal disorder Brother Adrenal insufficiency       Current Medications       Current Outpatient Medications:     b complex vitamins capsule, Take 1 capsule by mouth daily, Disp: , Rfl:     Calcium Carb-Cholecalciferol (LIQUID CALCIUM WITH D3) 600-1000 MG-UNIT CAPS, Take by mouth, Disp: , Rfl:     RESTASIS MULTIDOSE 0 05 % ophthalmic emulsion, Administer 1 drop to both eyes every 12 (twelve) hours, Disp: 0 4 mL, Rfl: 0    Vortioxetine HBr (Trintellix) 20 MG tablet, Take 1 tablet (20 mg total) by mouth daily, Disp: 90 tablet, Rfl: 1    fluticasone (FLONASE) 50 mcg/act nasal spray, USE (2) SPRAYS IN EACH NOSTRIL ONCE DAILY  , Disp: , Rfl:     pantoprazole (PROTONIX) 40 mg tablet, Take 1 tablet (40 mg total) by mouth daily before breakfast, Disp: 90 tablet, Rfl: 3    Xiidra 5 % op solution, INSTILL (1) DROP IN EACH EYE TWICE DAILY, Disp: , Rfl:      Allergies     No Known Allergies    Objective     /72 (BP Location: Left arm, Patient Position: Sitting, Cuff Size: Large)   Pulse 62   Temp 97 6 °F (36 4 °C)   Resp 14   Ht 5' 3 75" (1 619 m)   Wt 61 5 kg (135 lb 9 6 oz)   BMI 23 46 kg/m²      Physical Exam  Vitals signs and nursing note reviewed  Constitutional:       General: She is not in acute distress  Appearance: She is well-developed  HENT:      Head: Normocephalic and atraumatic  Nose: Nose normal       Mouth/Throat:      Pharynx: No oropharyngeal exudate  Eyes:      General: No scleral icterus  Conjunctiva/sclera: Conjunctivae normal       Pupils: Pupils are equal, round, and reactive to light  Neck:      Musculoskeletal: Normal range of motion and neck supple  Cardiovascular:      Rate and Rhythm: Regular rhythm  Heart sounds: Normal heart sounds  No murmur  Pulmonary:      Effort: Pulmonary effort is normal  No respiratory distress  Breath sounds: Normal breath sounds  Abdominal:      General: Bowel sounds are normal       Palpations: Abdomen is soft  Tenderness:  There is no abdominal tenderness  There is no right CVA tenderness, left CVA tenderness, guarding or rebound  Genitourinary:     General: Normal vulva  Vagina: No vaginal discharge  Comments: No CMT or palpable uterine/adnexal abnl    Breast exam:   No discrete palpable mass  No SC or axillary LAD  No nipple d/c or skin dimpling  Musculoskeletal: Normal range of motion  General: No tenderness  Skin:     General: Skin is warm and dry  Coloration: Skin is not jaundiced  Neurological:      General: No focal deficit present  Mental Status: She is alert and oriented to person, place, and time  Cranial Nerves: No cranial nerve deficit  Psychiatric:      Comments: Depressed, tearful at times  Denies SI, denies a/v halluc           Violetta Connor MD  [address]

## 2021-06-19 DIAGNOSIS — F41.8 DEPRESSION WITH ANXIETY: ICD-10-CM

## 2021-06-19 RX ORDER — VORTIOXETINE 20 MG/1
TABLET, FILM COATED ORAL
Qty: 90 TABLET | Refills: 1 | Status: SHIPPED | OUTPATIENT
Start: 2021-06-19 | End: 2021-12-23

## 2021-07-04 LAB
ALBUMIN SERPL-MCNC: 4 G/DL (ref 3.6–5.1)
ALBUMIN/GLOB SERPL: 2 (CALC) (ref 1–2.5)
ALP SERPL-CCNC: 54 U/L (ref 37–153)
ALT SERPL-CCNC: 14 U/L (ref 6–29)
AMYLASE SERPL-CCNC: 32 U/L (ref 21–101)
AST SERPL-CCNC: 23 U/L (ref 10–35)
BASOPHILS # BLD AUTO: 70 CELLS/UL (ref 0–200)
BASOPHILS NFR BLD AUTO: 1.4 %
BILIRUB SERPL-MCNC: 0.5 MG/DL (ref 0.2–1.2)
BUN SERPL-MCNC: 17 MG/DL (ref 7–25)
BUN/CREAT SERPL: ABNORMAL (CALC) (ref 6–22)
CALCIUM SERPL-MCNC: 9.4 MG/DL (ref 8.6–10.4)
CHLORIDE SERPL-SCNC: 105 MMOL/L (ref 98–110)
CHOLEST SERPL-MCNC: 215 MG/DL
CHOLEST/HDLC SERPL: 2.8 (CALC)
CO2 SERPL-SCNC: 31 MMOL/L (ref 20–32)
CREAT SERPL-MCNC: 0.69 MG/DL (ref 0.5–1.05)
EOSINOPHIL # BLD AUTO: 290 CELLS/UL (ref 15–500)
EOSINOPHIL NFR BLD AUTO: 5.8 %
ERYTHROCYTE [DISTWIDTH] IN BLOOD BY AUTOMATED COUNT: 12.2 % (ref 11–15)
GLOBULIN SER CALC-MCNC: 2 G/DL (CALC) (ref 1.9–3.7)
GLUCOSE SERPL-MCNC: 90 MG/DL (ref 65–99)
HCT VFR BLD AUTO: 39.7 % (ref 35–45)
HDLC SERPL-MCNC: 78 MG/DL
HGB BLD-MCNC: 13.1 G/DL (ref 11.7–15.5)
LDLC SERPL CALC-MCNC: 123 MG/DL (CALC)
LIPASE SERPL-CCNC: 60 U/L (ref 7–60)
LYMPHOCYTES # BLD AUTO: 1985 CELLS/UL (ref 850–3900)
LYMPHOCYTES NFR BLD AUTO: 39.7 %
MCH RBC QN AUTO: 29.6 PG (ref 27–33)
MCHC RBC AUTO-ENTMCNC: 33 G/DL (ref 32–36)
MCV RBC AUTO: 89.8 FL (ref 80–100)
MONOCYTES # BLD AUTO: 530 CELLS/UL (ref 200–950)
MONOCYTES NFR BLD AUTO: 10.6 %
NEUTROPHILS # BLD AUTO: 2125 CELLS/UL (ref 1500–7800)
NEUTROPHILS NFR BLD AUTO: 42.5 %
NONHDLC SERPL-MCNC: 137 MG/DL (CALC)
PLATELET # BLD AUTO: 276 THOUSAND/UL (ref 140–400)
PMV BLD REES-ECKER: 10.5 FL (ref 7.5–12.5)
POTASSIUM SERPL-SCNC: 4.3 MMOL/L (ref 3.5–5.3)
PROT SERPL-MCNC: 6 G/DL (ref 6.1–8.1)
RBC # BLD AUTO: 4.42 MILLION/UL (ref 3.8–5.1)
SL AMB EGFR AFRICAN AMERICAN: 115 ML/MIN/1.73M2
SL AMB EGFR NON AFRICAN AMERICAN: 99 ML/MIN/1.73M2
SODIUM SERPL-SCNC: 141 MMOL/L (ref 135–146)
TRIGL SERPL-MCNC: 52 MG/DL
TSH SERPL-ACNC: 1.19 MIU/L
WBC # BLD AUTO: 5 THOUSAND/UL (ref 3.8–10.8)

## 2021-07-07 ENCOUNTER — TELEPHONE (OUTPATIENT)
Dept: FAMILY MEDICINE CLINIC | Facility: CLINIC | Age: 51
End: 2021-07-07

## 2021-07-07 NOTE — TELEPHONE ENCOUNTER
----- Message from Yessy Price MD sent at 7/7/2021  4:33 PM EDT -----  Please schedule follow-up appt to review labs--thanks

## 2021-07-12 ENCOUNTER — HOSPITAL ENCOUNTER (OUTPATIENT)
Dept: RADIOLOGY | Facility: HOSPITAL | Age: 51
Discharge: HOME | End: 2021-07-12
Attending: FAMILY MEDICINE
Payer: COMMERCIAL

## 2021-07-12 VITALS — HEIGHT: 64 IN | WEIGHT: 131 LBS | BODY MASS INDEX: 22.36 KG/M2

## 2021-07-12 DIAGNOSIS — Z13.820 SCREENING FOR OSTEOPOROSIS: ICD-10-CM

## 2021-07-12 DIAGNOSIS — Z12.9 ENCOUNTER FOR SCREENING FOR MALIGNANT NEOPLASM: ICD-10-CM

## 2021-07-12 PROCEDURE — 77063 BREAST TOMOSYNTHESIS BI: CPT

## 2021-07-12 PROCEDURE — 77067 SCR MAMMO BI INCL CAD: CPT

## 2021-07-12 PROCEDURE — 77080 DXA BONE DENSITY AXIAL: CPT

## 2021-07-16 ENCOUNTER — OFFICE VISIT (OUTPATIENT)
Dept: FAMILY MEDICINE CLINIC | Facility: CLINIC | Age: 51
End: 2021-07-16
Payer: COMMERCIAL

## 2021-07-16 VITALS — TEMPERATURE: 97.8 F | BODY MASS INDEX: 22.36 KG/M2 | WEIGHT: 131 LBS | HEIGHT: 64 IN

## 2021-07-16 DIAGNOSIS — F41.8 DEPRESSION WITH ANXIETY: Primary | ICD-10-CM

## 2021-07-16 PROCEDURE — 99213 OFFICE O/P EST LOW 20 MIN: CPT | Performed by: FAMILY MEDICINE

## 2021-07-16 PROCEDURE — 3008F BODY MASS INDEX DOCD: CPT | Performed by: FAMILY MEDICINE

## 2021-08-06 NOTE — PROGRESS NOTES
Virtual Regular Visit    Verification of patient location:    Patient is located in the following state in which I hold an active license [place]      Assessment/Plan:    Problem List Items Addressed This Visit     Depression with anxiety - Primary     Cont Trintellix  Add Rexulti  Gvbrcc-dr-4-3 weeks with update-sooner prn         Relevant Medications    Brexpiprazole (REXULTI) 1 MG tablet               Reason for visit is   Chief Complaint   Patient presents with    Results     labs    Virtual Regular Visit        Encounter provider Sandra Guzmán MD    Provider located at [address] [place] [id number]      Recent Visits  No visits were found meeting these conditions  Showing recent visits within past 7 days and meeting all other requirements  Future Appointments  No visits were found meeting these conditions  Showing future appointments within next 150 days and meeting all other requirements       The patient was identified by name and date of birth  Cristina Perales was informed that this is a telemedicine visit and that the visit is being conducted through [facility] and patient was informed that this is a secure, HIPAA-compliant platform  She agrees to proceed     My office door was closed  No one else was in the room  She acknowledged consent and understanding of privacy and security of the video platform  The patient has agreed to participate and understands they can discontinue the visit at any time  Patient is aware this is a billable service  Subjective  Cristina Perales is a 46 y o  female          HPI   Follow-up  In- office appt changed to virtual visit as pt needing to attend  later today   Cont to struggle w depression prior to loss  Currently on Trintellix which in past worked well for pt but no longer finds to be as effective      Past Medical History:   Diagnosis Date    Celiac disease     Celiac disease     Depression     Dry eye     GERD (gastroesophageal reflux disease)     Oral contraceptive prescribed     last assessed - [id number]       Past Surgical History:   Procedure Laterality Date    EAR SURGERY Bilateral 12/2020    NO PAST SURGERIES      [place] COLONOSCOPY FLX DX W/COLLJ SPEC WHEN PFRMD N/A 8/8/2018    Procedure: EGD AND COLONOSCOPY;  Surgeon: Adela Garay MD;  Location: [facility] GI LAB; Service: Gastroenterology       Current Outpatient Medications   Medication Sig Dispense Refill    b complex vitamins capsule Take 1 capsule by mouth daily      Calcium Carb-Cholecalciferol (LIQUID CALCIUM WITH D3) 600-1000 MG-UNIT CAPS Take by mouth      fluticasone (FLONASE) 50 mcg/act nasal spray USE (2) SPRAYS IN EACH NOSTRIL ONCE DAILY   pantoprazole (PROTONIX) 40 mg tablet Take 1 tablet (40 mg total) by mouth daily before breakfast 90 tablet 3    RESTASIS MULTIDOSE 0 05 % ophthalmic emulsion Administer 1 drop to both eyes every 12 (twelve) hours 0 4 mL 0    Trintellix 20 MG tablet TAKE 1 TABLET BY MOUTH EVERY DAY 90 tablet 1    Xiidra 5 % op solution INSTILL (1) DROP IN EACH EYE TWICE DAILY      Brexpiprazole (REXULTI) 1 MG tablet Take 1 tablet (1 mg total) by mouth daily 30 tablet 0     No current facility-administered medications for this visit  No Known Allergies    Review of Systems   Constitutional: Positive for fatigue  Respiratory: Negative  Cardiovascular: Negative  Gastrointestinal: Negative  Psychiatric/Behavioral: Positive for decreased concentration, dysphoric mood and sleep disturbance  Negative for hallucinations, self-injury and suicidal ideas  The patient is nervous/anxious  Video Exam    Vitals:    07/16/21 0759   Temp: 97 8 °F (36 6 °C)   Weight: 59 4 kg (131 lb)   Height: 5' 3 75" (1 619 m)       Physical Exam   AAox3  Speech clear-Answering questions appropriately    I spent 20 minutes directly with the patient during this visit    VIRTUAL VISIT [address] verbally agrees to participate in [employer]  Pt is aware that [employer] could be limited without vital signs or the ability to perform a full hands-on physical Natacha Marquez understands she or the provider may request at any time to terminate the video visit and request the patient to seek care or treatment in person

## 2021-08-08 DIAGNOSIS — F41.8 DEPRESSION WITH ANXIETY: ICD-10-CM

## 2021-08-08 RX ORDER — BREXPIPRAZOLE 1 MG/1
TABLET ORAL
Qty: 30 TABLET | Refills: 0 | Status: SHIPPED | OUTPATIENT
Start: 2021-08-08 | End: 2021-08-28 | Stop reason: DRUGHIGH

## 2021-08-20 ENCOUNTER — TELEMEDICINE (OUTPATIENT)
Dept: FAMILY MEDICINE CLINIC | Facility: CLINIC | Age: 51
End: 2021-08-20
Payer: COMMERCIAL

## 2021-08-20 DIAGNOSIS — F41.8 DEPRESSION WITH ANXIETY: Primary | ICD-10-CM

## 2021-08-20 DIAGNOSIS — G47.00 INSOMNIA, UNSPECIFIED TYPE: ICD-10-CM

## 2021-08-20 PROCEDURE — 99213 OFFICE O/P EST LOW 20 MIN: CPT | Performed by: FAMILY MEDICINE

## 2021-09-10 NOTE — PROGRESS NOTES
Virtual Regular Visit    Verification of patient location:    Patient is located in the following state in which I hold an active license [place]      Assessment/Plan:    Problem List Items Addressed This Visit     Depression with anxiety - Primary    Insomnia      Cont Trintellix 20mg daily  Trial inc Rexulti to 2mg daily       Reason for visit is   Chief Complaint   Patient presents with    Virtual Regular Visit        Encounter provider oLrne Pollack MD    Provider located at [address] [place] [id number]      Recent Visits  No visits were found meeting these conditions  Showing recent visits within past 7 days and meeting all other requirements  Future Appointments  No visits were found meeting these conditions  Showing future appointments within next 150 days and meeting all other requirements       The patient was identified by name and date of birth  Rosemary Man was informed that this is a telemedicine visit and that the visit is being conducted through [facility] and patient was informed that this is a secure, HIPAA-compliant platform  She agrees to proceed     My office door was closed  No one else was in the room  She acknowledged consent and understanding of privacy and security of the video platform  The patient has agreed to participate and understands they can discontinue the visit at any time  Patient is aware this is a billable service  Subjective  Rosemary Man is a 46 y o  female          HPI   Unable to come into office today--attending  later morning  Depression sxs improving some w addition of Rexulti to Trintellix  Cont w sleep difficulties  D/w pt trying inc Rexulti prior to additional med for insomnia    Past Medical History:   Diagnosis Date    Celiac disease     Celiac disease 2019    Depression     Dry eye     GERD (gastroesophageal reflux disease)     Oral contraceptive prescribed     last assessed - [id number]       Past Surgical History:   Procedure Laterality Date    EAR SURGERY Bilateral 12/2020    NO PAST SURGERIES      WA COLONOSCOPY FLX DX W/COLLJ SPEC WHEN PFRMD N/A 8/8/2018    Procedure: EGD AND COLONOSCOPY;  Surgeon: Daksha Lutz MD;  Location: [facility]; Service: Gastroenterology       Current Outpatient Medications   Medication Sig Dispense Refill    b complex vitamins capsule Take 1 capsule by mouth daily      Brexpiprazole (REXULTI) 2 MG tablet Take 1 tablet (2 mg total) by mouth daily 30 tablet 2    Calcium Carb-Cholecalciferol (LIQUID CALCIUM WITH D3) 600-1000 MG-UNIT CAPS Take by mouth      fluticasone (FLONASE) 50 mcg/act nasal spray USE (2) SPRAYS IN EACH NOSTRIL ONCE DAILY   pantoprazole (PROTONIX) 40 mg tablet Take 1 tablet (40 mg total) by mouth daily before breakfast 90 tablet 3    ramelteon (ROZEREM) 8 mg tablet TAKE 1 TABLET BY MOUTH EVERY DAY AT BEDTIME AS NEEDED FOR INSOMNIA 30 tablet 0    RESTASIS MULTIDOSE 0 05 % ophthalmic emulsion Administer 1 drop to both eyes every 12 (twelve) hours 0 4 mL 0    Trintellix 20 MG tablet TAKE 1 TABLET BY MOUTH EVERY DAY 90 tablet 1    Xiidra 5 % op solution INSTILL (1) DROP IN EACH EYE TWICE DAILY       No current facility-administered medications for this visit  No Known Allergies    Review of Systems   Constitutional: Positive for fatigue  Negative for fever  Respiratory: Negative  Cardiovascular: Negative  Gastrointestinal: Negative  Psychiatric/Behavioral: Positive for decreased concentration and sleep disturbance  The patient is nervous/anxious  Video Exam    Physical Exam   AAOx3  Looks tired  Speech clear, answering questions approp  I spent 20 minutes directly with the patient during this visit    [address] [place] [address] Nipomo verbally agrees to participate in [employer]   Pt is aware that Virtual Care Services could be limited without vital signs or the ability to perform a full hands-on physical Kenneth Macias understands she or the provider may request at any time to terminate the video visit and request the patient to seek care or treatment in person

## 2021-10-01 ENCOUNTER — TELEPHONE (OUTPATIENT)
Dept: FAMILY MEDICINE CLINIC | Facility: CLINIC | Age: 51
End: 2021-10-01

## 2021-10-21 DIAGNOSIS — F41.8 DEPRESSION WITH ANXIETY: ICD-10-CM

## 2021-10-21 RX ORDER — BREXPIPRAZOLE 2 MG/1
TABLET ORAL
Qty: 90 TABLET | Refills: 1 | Status: SHIPPED | OUTPATIENT
Start: 2021-10-21 | End: 2022-03-11

## 2021-12-15 ENCOUNTER — TELEMEDICINE (OUTPATIENT)
Dept: FAMILY MEDICINE CLINIC | Facility: CLINIC | Age: 51
End: 2021-12-15
Payer: COMMERCIAL

## 2021-12-15 DIAGNOSIS — F41.8 DEPRESSION WITH ANXIETY: Primary | ICD-10-CM

## 2021-12-15 PROCEDURE — 1036F TOBACCO NON-USER: CPT | Performed by: FAMILY MEDICINE

## 2021-12-15 PROCEDURE — 99213 OFFICE O/P EST LOW 20 MIN: CPT | Performed by: FAMILY MEDICINE

## 2021-12-16 ENCOUNTER — TELEPHONE (OUTPATIENT)
Dept: FAMILY MEDICINE CLINIC | Facility: CLINIC | Age: 51
End: 2021-12-16

## 2021-12-22 DIAGNOSIS — F41.8 DEPRESSION WITH ANXIETY: ICD-10-CM

## 2021-12-23 RX ORDER — VORTIOXETINE 20 MG/1
TABLET, FILM COATED ORAL
Qty: 90 TABLET | Refills: 1 | Status: SHIPPED | OUTPATIENT
Start: 2021-12-23 | End: 2022-03-16 | Stop reason: ALTCHOICE

## 2021-12-28 ENCOUNTER — TELEMEDICINE (OUTPATIENT)
Dept: FAMILY MEDICINE CLINIC | Facility: CLINIC | Age: 51
End: 2021-12-28
Payer: COMMERCIAL

## 2021-12-28 VITALS — WEIGHT: 131 LBS | HEIGHT: 64 IN | BODY MASS INDEX: 22.36 KG/M2

## 2021-12-28 DIAGNOSIS — G47.00 INSOMNIA, UNSPECIFIED TYPE: Primary | ICD-10-CM

## 2021-12-28 DIAGNOSIS — F41.9 ANXIETY: ICD-10-CM

## 2021-12-28 PROCEDURE — 99213 OFFICE O/P EST LOW 20 MIN: CPT | Performed by: FAMILY MEDICINE

## 2021-12-28 PROCEDURE — 3008F BODY MASS INDEX DOCD: CPT | Performed by: FAMILY MEDICINE

## 2021-12-28 RX ORDER — ALPRAZOLAM 0.25 MG/1
0.25 TABLET ORAL
Qty: 30 TABLET | Refills: 0 | Status: SHIPPED | OUTPATIENT
Start: 2021-12-28 | End: 2022-01-31

## 2022-01-29 DIAGNOSIS — G47.00 INSOMNIA, UNSPECIFIED TYPE: ICD-10-CM

## 2022-01-29 DIAGNOSIS — F41.9 ANXIETY: ICD-10-CM

## 2022-01-31 RX ORDER — ALPRAZOLAM 0.25 MG/1
TABLET ORAL
Qty: 30 TABLET | Refills: 0 | Status: SHIPPED | OUTPATIENT
Start: 2022-01-31 | End: 2022-03-11

## 2022-03-08 ENCOUNTER — SOCIAL WORK (OUTPATIENT)
Dept: BEHAVIORAL/MENTAL HEALTH CLINIC | Facility: CLINIC | Age: 52
End: 2022-03-08
Payer: COMMERCIAL

## 2022-03-08 DIAGNOSIS — F43.21 ADJUSTMENT DISORDER WITH DEPRESSED MOOD: Primary | ICD-10-CM

## 2022-03-08 PROCEDURE — 90791 PSYCH DIAGNOSTIC EVALUATION: CPT | Performed by: SOCIAL WORKER

## 2022-03-10 DIAGNOSIS — F41.8 DEPRESSION WITH ANXIETY: ICD-10-CM

## 2022-03-10 DIAGNOSIS — G47.00 INSOMNIA, UNSPECIFIED TYPE: ICD-10-CM

## 2022-03-10 DIAGNOSIS — F41.9 ANXIETY: ICD-10-CM

## 2022-03-11 RX ORDER — BREXPIPRAZOLE 2 MG/1
TABLET ORAL
Qty: 90 TABLET | Refills: 0 | Status: SHIPPED | OUTPATIENT
Start: 2022-03-11 | End: 2022-03-16 | Stop reason: ALTCHOICE

## 2022-03-11 RX ORDER — ALPRAZOLAM 0.25 MG/1
TABLET ORAL
Qty: 30 TABLET | Refills: 0 | Status: SHIPPED | OUTPATIENT
Start: 2022-03-11 | End: 2022-04-08 | Stop reason: SDUPTHER

## 2022-03-14 ENCOUNTER — TELEPHONE (OUTPATIENT)
Dept: FAMILY MEDICINE CLINIC | Facility: CLINIC | Age: 52
End: 2022-03-14

## 2022-03-16 ENCOUNTER — TELEMEDICINE (OUTPATIENT)
Dept: FAMILY MEDICINE CLINIC | Facility: CLINIC | Age: 52
End: 2022-03-16
Payer: COMMERCIAL

## 2022-03-16 VITALS — TEMPERATURE: 97.1 F

## 2022-03-16 DIAGNOSIS — F41.8 DEPRESSION WITH ANXIETY: Primary | ICD-10-CM

## 2022-03-16 PROCEDURE — 1036F TOBACCO NON-USER: CPT | Performed by: FAMILY MEDICINE

## 2022-03-16 PROCEDURE — 99213 OFFICE O/P EST LOW 20 MIN: CPT | Performed by: FAMILY MEDICINE

## 2022-03-16 RX ORDER — DESVENLAFAXINE 50 MG/1
50 TABLET, EXTENDED RELEASE ORAL DAILY
Qty: 30 TABLET | Refills: 1 | Status: SHIPPED | OUTPATIENT
Start: 2022-03-16 | End: 2022-04-08 | Stop reason: DRUGHIGH

## 2022-03-17 NOTE — PSYCH
This note was not shared with the patient due to this is a psychotherapy note     Assessment/Plan:      Diagnoses and all orders for this visit:    Adjustment disorder with depressed mood        Subjective:        Patient ID: Vidya Cardenas is a 46 y o  female presented for an initial outpatient individual counseling session after her most recent office visit on 12/28/21 with her primary care physician due to insomnia,depression and anxiety   A review of her medical record revealed that she has no acute medical issues at this time  The undersigned therapist provided Maru Quintero with an orientation to [employer] by summarizing the counseling process, counseling experience and philosophy of treatment  Discussed hospital policies and procedures and paid special attention to reviewing the limitations of confidentiality and emergency procedures  The undersigned therapist began the process of establishing rapport and gaining a thorough understanding of the presenting problem by completing a comprehensive psychosocial assessment  Maru Quintero presents to therapy as she's recently  and moved out in November 2021  Maru Quintero reports she's been  since July 2021 and was 27 years  but unhappy for most of it  Maru Quintero reports she had two affairs within this marriage and she's been experiencing high levels of anxiety and guilt  Corrie reports financials have also been a stressor since she sometimes has thoughts that she won't be able to live on her own  The undersigned therapist provided Maru Quintero with psychoeducation on CBT and rational/irrtaional thoughts  Maru Quintero 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  Maru Quintero was raised Denominational and reports high guilt due to her Shinto and infidelity  Corrie's parents are still  and are supportive of her    Maru Quintero has two children, both boys, who she reports have gone on to college but her oldest had a hard time accepting her separation  The undersigned therapist assisted Corrie process her feelings about her marriage and current relationship with her paramour  Varinder Antony denies suicidal intent, gesture or ideation at this time  HPI:   Pre-morbid level of function and History of Present Illness: None Reported  Previous Psychiatric/psychological treatment/year: None reported   Current Psychiatrist/Therapist: None reported   Outpatient and/or Partial and Other Community Resources Used (CTT, ICM, VNA): none reported      Problem Assessment:     SOCIAL/VOCATION:  Family Constellation (include parents, relationship with each and pertinent Psych/Medical History):     Family History   Problem Relation Age of Onset    Diabetes Mother     Hypertension Mother     Adrenal disorder Brother         Adrenal insufficiency    No Known Problems Maternal Aunt     No Known Problems Maternal Aunt     No Known Problems Paternal Aunt     No Known Problems Paternal Aunt        Mother: Still alive, support of her  Spouse: , not getting  due to benefits  Father: Still alive, supportive of her as well    Children: Two sons, over 19+    Sibling: Unknown   Sibling: Unknown   Children: None   Other: None     Domestic Violence: No past history of domestic violence    Additional Comments related to family/relationships/peer support: Corrie reports she's seeing a  man at this time, and she's been experiencing feelings of guilt  However, she is aware she is unhappy in her marriage and wants to separate and learn to live alone  Varinder Antony enjoys going out to drink coffee and have an emotionally intimate relationship with her male friend        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 No    FUNCTIONAL STATUS: There has been a recent change in Corrie ability to do the following: does not need can service    Level of Assistance Needed/By Whom?: None  Asher Gutierrez learns best by  reading, listening, demonstration and picture    SUBSTANCE ABUSE ASSESSMENT: no substance abuse    Substance/Route/Age/Amount/Frequency/Last Use: None reported     DETOX HISTORY: none reported    Previous detox/rehab treatment: none reported      HEALTH ASSESSMENT: no referral to PCP needed    LEGAL: No Mental Health Advance Directive or Power of  on file    Prenatal History: N/A    Delivery History: Unknown  Developmental Milestones: N/A  Corrie developed fine as a child  No other issues or concerns mentioned  Risk Assessment:   The following ratings are based on my interview(s) with herself    Risk of Harm to Self:   Demographic risk factors include  and  status  Historical Risk Factors include none reproted  Recent Specific Risk Factors include worries about finances or work  Additional Factors for a Child or Adolescent gender: female (more likely to attempt)    Risk of Harm to Others:   Demographic Risk Factors include none  Historical Risk Factors include: none   Recent Specific Risk Factors include multiple stressors    Access to Weapons:   Asher Gutierrez has access to the following weapons: none   The following steps have been taken to ensure weapons are properly secured: none    Based on the above information, the client presents the following risk of harm to self or others:  low    The following interventions are recommended:   referral to continued outpatient counseling with undersigned therapist     Notes regarding this Risk Assessment: Not at high risk for self-harm however, high anxiety due to current stressors           Review Of Systems:     Mood Anxiety   Behavior Normal    Thought Content Normal   General Emotional Problems and Sleep Disturbances   Personality Normal   Other Psych Symptoms Normal   Constitutional unknown    ENT unknown   Cardiovascular unknown   Respiratory unknown   Gastrointestinal unknown   Genitourinary unknown   Musculoskeletal unknown   Integumentary unknown- see medical record   Neurological unknown- see medical record   Endocrine unknown- see medical record         Mental status:  Appearance calm and cooperative , adequate hygiene and grooming and good eye contact    Mood anxious   Affect affect was tearful   Speech a normal rate   Thought Processes normal thought processes   Hallucinations no hallucinations present    Thought Content no delusions   Abnormal Thoughts no suicidal thoughts  and no homicidal thoughts    Orientation  oriented to person and place and time   Remote Memory short term memory intact and long term memory intact   Attention Span concentration intact   Intellect Appears to be of Average Intelligence   Fund of Knowledge displays adequate knowledge of current events   Insight Insight intact   Judgement judgment was intact   Muscle Strength unknown- see medical record   Language no difficulty naming common objects, no difficulty repeating a phrase  and no difficulty writing a sentence    Pain unknown- see medical record   Pain Scale 0

## 2022-03-17 NOTE — PATIENT INSTRUCTIONS
Please continue to schedule a follow-up session within one week  In case of a psychiatric emergency please contact any of the following:  [facility] [phone] or [phone]     National Suicide Prevention Lifeline : 7-618.392.5403

## 2022-03-18 ENCOUNTER — TELEMEDICINE (OUTPATIENT)
Dept: BEHAVIORAL/MENTAL HEALTH CLINIC | Facility: CLINIC | Age: 52
End: 2022-03-18
Payer: COMMERCIAL

## 2022-03-18 DIAGNOSIS — F43.21 ADJUSTMENT DISORDER WITH DEPRESSED MOOD: Primary | ICD-10-CM

## 2022-03-18 PROCEDURE — 90834 PSYTX W PT 45 MINUTES: CPT | Performed by: SOCIAL WORKER

## 2022-03-25 NOTE — PATIENT INSTRUCTIONS
Please continue to schedule a follow-up session within one week  In case of a psychiatric emergency please contact any of the following:  [facility] [phone] or [phone]     National Suicide Prevention Lifeline : 4-715.350.9634

## 2022-03-25 NOTE — PSYCH
Subjective:       Patient ID: Genny Watson is a 78 y.o. female.    Chief Complaint: Follow-up (b/p and diabetic)    78-year-old female coming in for follow-up on high blood pressure and diabetes.  She has no active complaints with no complaints of dizziness or lightheadedness and no headaches.  She has no complaints of chest pain or shortness of breath, no nausea or vomiting, no bowel changes and no urinary complaints.  She has no orthostatic dizziness.  Her A1c was done recently as well as a BMP with good results and good diabetic control.  She is due for a zoster vaccine but declines one.  She is due for a foot exam    Past Medical History:  4/30/2012: ALLERGIC RHINITIS  No date: Arthritis  No date: Breast cancer      Comment: right, s/p right mastectomy>20yr ago  No date: Cataract  No date: Diabetes mellitus type II  No date: Diverticulitis  No date: Diverticulosis  No date: Esophageal mass      Comment: hiatal hernia with testing  No date: Fuchs' corneal dystrophy  No date: Glaucoma  No date: Hernia, hiatal  No date: Hyperlipidemia  No date: Hypertension  No date: Macular degeneration  No date: Pulmonary embolism  No date: Seizures  12/2014: Skin cancer of face      Comment: Dr M Weil   No date: UTI (lower urinary tract infection)    Past Surgical History:  No date: angeogram  No date: APPENDECTOMY  No date: BREAST SURGERY  1/2015: cardio stent       Comment: Dr Dodson  around 2003: COLONOSCOPY  1/13/2016: COLONOSCOPY N/A      Comment: Procedure: COLONOSCOPY;  Surgeon: Mario Bhakta MD;  Location: [facility];  Service:                Endoscopy;  Laterality: N/A; repeat in 5 years                for surveillance  No date: CORNEAL TRANSPLANT  No date: EYE SURGERY  No date: HYSTERECTOMY  No date: MASTECTOMY      Comment: right >20 years ago  01/13/2016: UPPER GASTROINTESTINAL ENDOSCOPY      Comment: Dr. Bhakta    Current Outpatient Prescriptions on File Prior to Visit:  amLODIPine  This note was not shared with the patient due to this is a psychotherapy note     Virtual Regular Visit    Verification of patient location:    Patient is located in the following state in which I hold an active license [place]      Assessment/Plan:    Problem List Items Addressed This Visit     None      Visit Diagnoses     Adjustment disorder with depressed mood    -  Primary               Reason for visit is No chief complaint on file  Encounter provider Mortimer Hales, LCSW    Provider located at [address] [address] [phone]      Recent Visits  Date Type Provider Dept   03/18/22 Telemedicine Mortimer Hales, LCSW [facility]   Showing recent visits within past 7 days and meeting all other requirements  Future Appointments  No visits were found meeting these conditions  Showing future appointments within next 150 days and meeting all other requirements       The patient was identified by name and date of birth  India Serra was informed that this is a telemedicine visit and that the visit is being conducted through Telephone  My office door was closed  No one else was in the room  She acknowledged consent and understanding of privacy and security of the video platform  The patient has agreed to participate and understands they can discontinue the visit at any time  Patient is aware this is a billable service  Debbie Walsh is a 46 y o  female who presented for a follow-up virtual individual counseling session  At [facility]'s request, today's session was conducted as a virtual phone session in order to comply with social distancing secondary to the coronavirus pandemic  It was the undersigned therapist's intent to complete a video visit but Quoc Shirley was unable to make video connection so we defaulted to the phone  (NORVASC) 10 MG tablet, TAKE 1 TABLET DAILY (NEED APPOINTMENT FOR REFILLS), Disp: 90 tablet, Rfl: 3  aspirin (ECOTRIN) 81 MG EC tablet, Take 81 mg by mouth once daily., Disp: , Rfl:   atorvastatin (LIPITOR) 80 MG tablet, Take 1 tablet (80 mg total) by mouth once daily., Disp: 90 tablet, Rfl: 3  bimatoprost (LUMIGAN) 0.01 % Drop, Place 1 drop into both eyes every evening. , Disp: , Rfl:   clopidogrel (PLAVIX) 75 mg tablet, TAKE 1 TABLET DAILY (NEED OFFICE VISIT BEFORE NEXT REFILL, LAST SEEN 3/2017, THIS WILL BE THE LAST PRESCRIPTION IF VISIT IS NOT MADE), Disp: 90 tablet, Rfl: 3  Lactobacillus acidophilus 10 billion cell Cap, Take 1 capsule by mouth once daily. 100 million active cultures, Disp: , Rfl:   lisinopril (PRINIVIL,ZESTRIL) 40 MG tablet, TAKE 1 TABLET DAILY, Disp: 90 tablet, Rfl: 2  metFORMIN (GLUCOPHAGE-XR) 500 MG 24 hr tablet, TAKE 1 TABLET EVERY EVENING, Disp: 90 tablet, Rfl: 0  pantoprazole (PROTONIX) 40 MG tablet, Take 1 tablet (40 mg total) by mouth once daily., Disp: 90 tablet, Rfl: 3  prednisoLONE acetate (PRED FORTE) 1 % DrpS, Place 1 drop into the right eye once daily. , Disp: , Rfl:   timolol maleate 0.5% (TIMOPTIC) 0.5 % Drop, Place 1 drop into the right eye 2 (two) times daily. , Disp: , Rfl:   VIT C/E/ZN/COPPR/LUTEIN/ZEAXAN (PRESERVISION AREDS 2 ORAL), Take 1 capsule by mouth 2 (two) times daily., Disp: , Rfl:   (DISCONTINUED) metoprolol succinate (TOPROL XL) 100 MG 24 hr tablet, Take 1 tablet (100 mg total) by mouth once daily., Disp: 30 tablet, Rfl: 3    No current facility-administered medications on file prior to visit.       Zoster Vaccine due on 09/19/1997-declines  Foot Exam due on 07/18/2018        Review of Systems   Respiratory: Negative for chest tightness and shortness of breath.    Cardiovascular: Negative for chest pain and palpitations.   Gastrointestinal: Negative for abdominal pain, anal bleeding, blood in stool, constipation, diarrhea, nausea and vomiting.   Endocrine:  During today's session Corrie reports she tried ending her relationship with her paramour  She's been feeling high anxiety since having the conversation with him and feels like "its the right thing to do," but it goes against what she wants  The undersigned therapist assisted Corrie process her feelings about her relationship with the tanya  Eliza Murphy was given a youtube video to watch on infidelity and processed her feelings about it  Eliza Murphy assumed undersigned therapist perspective was to end it, however writer clarified that Eliza Murphy can do as she pleases and be accepting of her decisions  Eliza Murphy reports she's been spending time with a friend named Lakhwinder Ricks and doing yoga and reports she's feeling a lot of guilt and unsure if she can share what she's going through with others  Eliza Murphy feels high anxiety since [place] where she officially moved out of her home and went to live alone, but also spent [place] acting as she were friends with her ex-  Eliza Murphy reports she had to take 1/2 a tablet of Xanax for her anxiety but reports she hasn't slept well in years  Eliza Murphy reports she's been crying and hates not having control  The undersigned therapist encouraged her to continue expressing her feelings, and being honest in therapy to assist her process the guilt  Eliza Murphy reports she does not want to end the relationship with her friend and the undersigned therapist encouraged she set appropriate boundaries and expectations if she continues the relationship  Eliza Murphy reports this weekend her oldest son, who was impacted the most with the separation, will be sleeping over her house  Eliza Murphy also experiences guilt because her son was upset with her for many months due to her previous infidelity  The undersigned therapist assisted Corrie process her feelings about her relationship with her son    Eliza Murphy continues to feel anxious but reports she's in a book club, joined yoga, watches television but she's concerned she is not as social Negative for polydipsia and polyuria.   Genitourinary: Negative for dysuria, frequency, hematuria and urgency.       Objective:      Physical Exam   Constitutional: She is oriented to person, place, and time. She appears well-developed and well-nourished. No distress.   Good blood pressure control  Normal weight with a BMI of 25.5 she is down 1.4 pounds from her January 22, 2018 visit   HENT:   Head: Normocephalic and atraumatic.   Right Ear: External ear normal.   Left Ear: External ear normal.   Nose: Nose normal.   Mouth/Throat: Oropharynx is clear and moist. No oropharyngeal exudate.   Eyes: Conjunctivae are normal. Pupils are equal, round, and reactive to light. Right eye exhibits no discharge. Left eye exhibits no discharge. No scleral icterus.   Neck: Neck supple. No JVD present. No thyromegaly present.   Cardiovascular: Normal rate, regular rhythm and normal heart sounds.  Exam reveals no gallop and no friction rub.    No murmur heard.  Pulses:       Dorsalis pedis pulses are 1+ on the right side, and 1+ on the left side.        Posterior tibial pulses are 1+ on the right side, and 1+ on the left side.   Pulmonary/Chest: Effort normal and breath sounds normal. She has no wheezes. She has no rales. She exhibits no tenderness.   Abdominal: Soft. Bowel sounds are normal. She exhibits no mass. There is no tenderness. There is no rebound and no guarding.   Musculoskeletal: Normal range of motion. She exhibits no edema or tenderness.        Right foot: There is normal range of motion and no deformity.        Left foot: There is normal range of motion and no deformity.   Feet:   Right Foot:   Protective Sensation: 8 sites tested. 8 sites sensed.   Skin Integrity: Negative for ulcer, blister, skin breakdown, erythema, warmth, callus or dry skin.   Left Foot:   Protective Sensation: 8 sites tested. 8 sites sensed.   Skin Integrity: Negative for ulcer, blister, skin breakdown, erythema, warmth, callus or dry skin.  as she used to be  Som Pruett also reports since diagnosed with Celiacs disease she feels eating is not as fun as it ws before  Som Pruett takes Melatonin to sleep, but reports it doesn't KEEP her asleep, Som Pruett was encouraged to tell her PCP as sleep is an important part of our routine  Som Pruett denies suicidal intent, gesture or ideation at this time  HPI     Past Medical History:   Diagnosis Date    Celiac disease     Celiac disease 2019    Depression     Dry eye     GERD (gastroesophageal reflux disease)     Oral contraceptive prescribed     last assessed - [id number]       Past Surgical History:   Procedure Laterality Date    EAR SURGERY Bilateral 12/2020    NO PAST SURGERIES      VT COLONOSCOPY FLX DX W/COLLJ SPEC WHEN PFRMD N/A 8/8/2018    Procedure: EGD AND COLONOSCOPY;  Surgeon: Sheila Leal MD;  Location: [facility]; Service: Gastroenterology       Current Outpatient Medications   Medication Sig Dispense Refill    ALPRAZolam (XANAX) 0 25 mg tablet TAKE 1 TABLET BY MOUTH DAILY AT BEDTIME AS NEEDED FOR ANXIETY  30 tablet 0    desvenlafaxine succinate (PRISTIQ) 50 mg 24 hr tablet Take 1 tablet (50 mg total) by mouth daily 30 tablet 1     No current facility-administered medications for this visit  No Known Allergies    Review of Systems   Psychiatric/Behavioral: Positive for decreased concentration and sleep disturbance  The patient is nervous/anxious  Video Exam    There were no vitals filed for this visit  Physical Exam  Psychiatric:         Attention and Perception: Attention and perception normal          Mood and Affect: Affect normal  Mood is anxious  Speech: Speech normal          Behavior: Behavior normal  Behavior is cooperative  Thought Content:  Thought content normal          Cognition and Memory: Cognition and memory normal          Judgment: Judgment normal           I spent 45 minutes directly with the patient during this visit    VIRTUAL VISIT   Lymphadenopathy:     She has no cervical adenopathy.   Neurological: She is alert and oriented to person, place, and time. She has normal reflexes. No cranial nerve deficit.   Skin: Skin is warm and dry. Capillary refill takes less than 2 seconds. No rash noted. She is not diaphoretic. No erythema.   Psychiatric: She has a normal mood and affect.   Nursing note and vitals reviewed.      Assessment:       1. Controlled type 2 diabetes mellitus without complication, without long-term current use of insulin    2. Good hypertension control    3. Hypertension associated with diabetes    4. Hypertension, essential    5. BMI 25.0-25.9,adult        Plan:       1. Controlled type 2 diabetes mellitus without complication, without long-term current use of insulin  Lab Results   Component Value Date    HGBA1C 6.2 (H) 07/09/2018     Well controlled, no changes needed, recheck 6 months    2. Good hypertension control  Well controlled, stable and no changes needed    3. Hypertension associated with diabetes  Well controlled, no changes needed    4. Hypertension, essential  - metoprolol succinate (TOPROL XL) 100 MG 24 hr tablet; Take 1 tablet (100 mg total) by mouth once daily.  Dispense: 90 tablet; Refill: 3    5. BMI 25.0-25.9,adult         Patient readiness: acceptance and barriers:none    During the course of the visit the patient was educated and counseled about the following:     Diabetes:  Discussed general issues about diabetes pathophysiology and management.  Addressed ADA diet.  Encouraged aerobic exercise.  Discussed foot care.  Reminded to get yearly retinal exam.  Hypertension:   Medication: no change.  Dietary sodium restriction.  Regular aerobic exercise.    Goals: Diabetes: Maintain Hemoglobin A1C below 7 and Hypertension: Reduce Blood Pressure    Did patient meet goals/outcomes: Yes    The following self management tools provided: blood pressure log  blood glucose log    Patient Instructions (the written plan)  [address] verbally agrees to participate in [employer]  Pt is aware that [employer] could be limited without vital signs or the ability to perform a full hands-on physical Sarah Larve understands she or the provider may request at any time to terminate the video visit and request the patient to seek care or treatment in person  was given to the patient/family.     Time spent with patient: 30 minutes

## 2022-03-28 NOTE — PROGRESS NOTES
Virtual Regular Visit    Verification of patient location:    Patient is located in the following state in which I hold an active license [place]      Assessment/Plan:    Problem List Items Addressed This Visit     Depression with anxiety - Primary    Relevant Medications    desvenlafaxine succinate (PRISTIQ) 50 mg 24 hr tablet      will taper off trintellix and rexulti   Cont counseling  Follow-up in 2 weeks with update -sooner prn       Reason for visit is   Chief Complaint   Patient presents with    Anxiety     would like to discuss tweaking meds she is not feeling herself    Virtual Regular Visit        Encounter provider Adriana Jauregui MD    Provider located at [address] [place] [id number]      Recent Visits  No visits were found meeting these conditions  Showing recent visits within past 7 days and meeting all other requirements  Future Appointments  No visits were found meeting these conditions  Showing future appointments within next 150 days and meeting all other requirements       The patient was identified by name and date of birth  Miguelito Jack was informed that this is a telemedicine visit and that the visit is being conducted through [address] [place] [address] Now and patient was informed that this is a secure, HIPAA-compliant platform  She agrees to proceed     My office door was closed  No one else was in the room  She acknowledged consent and understanding of privacy and security of the video platform  The patient has agreed to participate and understands they can discontinue the visit at any time  Patient is aware this is a billable service  Subjective  Miguelito Jack is a 46 y o  female          HPI   Follow-up   Ongoing depression/anxiety sec to recent marital separation  Inc sxs despite inc in rexulti in addition to the trintellix  Would like to discuss med change  Is counseling w therapist, Jarred Sohre, in our office    Past Medical History:   Diagnosis Date    Celiac disease     Celiac disease 2019    Depression     Dry eye     GERD (gastroesophageal reflux disease)     Oral contraceptive prescribed     last assessed - [id number]       Past Surgical History:   Procedure Laterality Date    EAR SURGERY Bilateral 12/2020    NO PAST SURGERIES      LA COLONOSCOPY FLX DX W/COLLJ SPEC WHEN PFRMD N/A 8/8/2018    Procedure: EGD AND COLONOSCOPY;  Surgeon: Bernice Benitez MD;  Location: [place] [address] GI LAB; Service: Gastroenterology       Current Outpatient Medications   Medication Sig Dispense Refill    ALPRAZolam (XANAX) 0 25 mg tablet TAKE 1 TABLET BY MOUTH DAILY AT BEDTIME AS NEEDED FOR ANXIETY  30 tablet 0    desvenlafaxine succinate (PRISTIQ) 50 mg 24 hr tablet Take 1 tablet (50 mg total) by mouth daily 30 tablet 1     No current facility-administered medications for this visit  No Known Allergies    Review of Systems   Constitutional: Positive for fatigue  Respiratory: Negative  Cardiovascular: Negative  Gastrointestinal: Negative  Psychiatric/Behavioral: Positive for dysphoric mood and sleep disturbance  Negative for hallucinations, self-injury and suicidal ideas  The patient is nervous/anxious  Video Exam    Vitals:    03/16/22 1425   Temp: (!) 97 1 °F (36 2 °C)       Physical Exam   AAOx3  Emotionally distressed, tearful at times  Speech clear, answering questions appropriately  I spent 15 minutes directly with the patient during this visit    [address] [place] [address] verbally agrees to participate in [employer]  Pt is aware that [employer] could be limited without vital signs or the ability to perform a full hands-on physical Malena Mejia understands she or the provider may request at any time to terminate the video visit and request the patient to seek care or treatment in person

## 2022-03-29 ENCOUNTER — SOCIAL WORK (OUTPATIENT)
Dept: BEHAVIORAL/MENTAL HEALTH CLINIC | Facility: CLINIC | Age: 52
End: 2022-03-29
Payer: COMMERCIAL

## 2022-03-29 DIAGNOSIS — F43.21 ADJUSTMENT DISORDER WITH DEPRESSED MOOD: Primary | ICD-10-CM

## 2022-03-29 PROCEDURE — 90834 PSYTX W PT 45 MINUTES: CPT | Performed by: SOCIAL WORKER

## 2022-03-29 NOTE — PSYCH
This note was not shared with the patient due to this is a psychotherapy note     Assessment/Plan:      Diagnoses and all orders for this visit:    Adjustment disorder with depressed mood          Subjective:     Patient ID: Kevin Varela is a 46 y o  female who presented for her follow-up outpatient counseling appointment with undersigned therapist  During today's session the undersigned therapist assisted Corrie process her feelings about her previous affairs and her current feelings of "guilt" for her   Apryl Zheng reports she feels bad for her  as he's alone and not really "coping" with any of this, but we discussed how Apryl Zheng is not in control of her 's actions, thoughts or behaviors  Apryl Zheng continues to be  to her  but reports the marriage is for financial and medical benefits  The undersigned therapist assisted Corrie gain insight on her pattern of comfort,  takes care of all the bills, and the financial ties that they have accrued together- cause more financial stress for Corrie Zheng reports she will talk with Serenity Zuniga, this week  Apryl Zheng continues to work towards self-care by doing yoga, allowing herself to decompress when she wants at home, reaching out to friends more and continues to see her friend whom she has emotional ties with  Apryl Zheng continues to feel anxious and sometimes sad, but discussed she's been doing better than previously  Apryl Zheng denies suicidal intent, gesture or ideation at this time  The undersigned therapist provided Apryl Zheng with 45 minutes of psychotherapy  Review of Systems   Psychiatric/Behavioral: Positive for decreased concentration, dysphoric mood and sleep disturbance  The patient is nervous/anxious  Objective:     Physical Exam  Psychiatric:         Attention and Perception: Attention and perception normal          Mood and Affect: Affect normal  Mood is anxious           Speech: Speech normal          Behavior: Behavior normal  Behavior is cooperative  Thought Content:  Thought content normal          Cognition and Memory: Cognition and memory normal          Judgment: Judgment normal

## 2022-03-29 NOTE — PATIENT INSTRUCTIONS
Please continue to schedule a follow-up session within one week  In case of a psychiatric emergency please contact any of the following:  [facility] [phone] or [phone]     [place] Suicide Prevention Lifeline : [phone]

## 2022-04-08 ENCOUNTER — TELEMEDICINE (OUTPATIENT)
Dept: FAMILY MEDICINE CLINIC | Facility: CLINIC | Age: 52
End: 2022-04-08
Payer: COMMERCIAL

## 2022-04-08 VITALS — HEIGHT: 64 IN | TEMPERATURE: 98.7 F | BODY MASS INDEX: 22.36 KG/M2 | WEIGHT: 131 LBS

## 2022-04-08 DIAGNOSIS — F41.9 ANXIETY: ICD-10-CM

## 2022-04-08 DIAGNOSIS — G47.00 INSOMNIA, UNSPECIFIED TYPE: ICD-10-CM

## 2022-04-08 DIAGNOSIS — F32.A ANXIETY AND DEPRESSION: Primary | ICD-10-CM

## 2022-04-08 DIAGNOSIS — F41.9 ANXIETY AND DEPRESSION: Primary | ICD-10-CM

## 2022-04-08 PROCEDURE — 3008F BODY MASS INDEX DOCD: CPT | Performed by: FAMILY MEDICINE

## 2022-04-08 PROCEDURE — 99213 OFFICE O/P EST LOW 20 MIN: CPT | Performed by: FAMILY MEDICINE

## 2022-04-08 PROCEDURE — 1036F TOBACCO NON-USER: CPT | Performed by: FAMILY MEDICINE

## 2022-04-08 RX ORDER — DESVENLAFAXINE 100 MG/1
100 TABLET, EXTENDED RELEASE ORAL DAILY
Qty: 90 TABLET | Refills: 0 | Status: SHIPPED | OUTPATIENT
Start: 2022-04-08 | End: 2022-04-27 | Stop reason: DRUGHIGH

## 2022-04-08 RX ORDER — ALPRAZOLAM 0.25 MG/1
0.25 TABLET ORAL
Qty: 30 TABLET | Refills: 0 | Status: SHIPPED | OUTPATIENT
Start: 2022-04-08 | End: 2022-05-13

## 2022-04-10 NOTE — PROGRESS NOTES
Virtual Regular Visit    Verification of patient location:    Patient is located in the following state in which I hold an active license [place]      Assessment/Plan:    Problem List Items Addressed This Visit     Insomnia    Relevant Medications    ALPRAZolam (XANAX) 0 25 mg tablet      Other Visit Diagnoses     Anxiety and depression    -  Primary    Relevant Medications    ALPRAZolam (XANAX) 0 25 mg tablet    desvenlafaxine (PRISTIQ) 100 mg 24 hr tablet    Anxiety        Relevant Medications    ALPRAZolam (XANAX) 0 25 mg tablet               Reason for visit is   Chief Complaint   Patient presents with    Anxiety     pt not feeling back to herself yet     Virtual Regular Visit        Encounter provider Fabio May MD    Provider located at [address] [place] [id number]      Recent Visits  Date Type Provider Dept   04/08/22 [address],[facility], [place] [address] [place] [address] Road recent visits within past 7 days and meeting all other requirements  Future Appointments  No visits were found meeting these conditions  Showing future appointments within next 150 days and meeting all other requirements       The patient was identified by name and date of birth  Vicky Ritchie was informed that this is a telemedicine visit and that the visit is being conducted through [address] [place] [address] Now and patient was informed that this is a secure, HIPAA-compliant platform  She agrees to proceed     My office door was closed  No one else was in the room  She acknowledged consent and understanding of privacy and security of the video platform  The patient has agreed to participate and understands they can discontinue the visit at any time  Patient is aware this is a billable service  Subjective  Vicky Ritchie is a 46 y o  female          HPI   Follow-up on anxiety/depression  Tolerating Pristiq w/o adverse effect  Would like to try increasing dose  Is going away on trip w friends 4/21   Would like higher dose rx at this time to try prior to leaving  Also needs refill of alprazolam for prn use    Past Medical History:   Diagnosis Date    Celiac disease     Celiac disease 2019    Depression     Dry eye     GERD (gastroesophageal reflux disease)     Oral contraceptive prescribed     last assessed - [id number]       Past Surgical History:   Procedure Laterality Date    EAR SURGERY Bilateral 12/2020    NO PAST SURGERIES      MN COLONOSCOPY FLX DX W/COLLJ SPEC WHEN PFRMD N/A 8/8/2018    Procedure: EGD AND COLONOSCOPY;  Surgeon: Pb Smith MD;  Location: [facility] [address] [facility]; Service: Gastroenterology       Current Outpatient Medications   Medication Sig Dispense Refill    ALPRAZolam (XANAX) 0 25 mg tablet Take 1 tablet (0 25 mg total) by mouth daily at bedtime as needed for anxiety 30 tablet 0    desvenlafaxine (PRISTIQ) 100 mg 24 hr tablet Take 1 tablet (100 mg total) by mouth daily 90 tablet 0     No current facility-administered medications for this visit  No Known Allergies    Review of Systems   Constitutional: Positive for fatigue  Negative for fever  Respiratory: Negative  Cardiovascular: Negative  Neurological: Negative  Psychiatric/Behavioral: Positive for dysphoric mood and sleep disturbance  Negative for hallucinations, self-injury and suicidal ideas  The patient is nervous/anxious  Video Exam    Vitals:    04/08/22 1049   Temp: 98 7 °F (37 1 °C)   Weight: 59 4 kg (131 lb)   Height: 5' 3 75" (1 619 m)       Physical Exam   AAOx3  Speech clear, answering questions appropriately  I spent 15 minutes directly with the patient during this visit    [address] [place] [address] verbally agrees to participate in [employer]   Pt is aware that [employer] could be limited without vital signs or the ability to perform a full hands-on physical Denece Shave understands she or the provider may request at any time to terminate the video visit and request the patient to seek care or treatment in person

## 2022-04-18 ENCOUNTER — TELEPHONE (OUTPATIENT)
Dept: FAMILY MEDICINE CLINIC | Facility: CLINIC | Age: 52
End: 2022-04-18

## 2022-05-03 ENCOUNTER — SOCIAL WORK (OUTPATIENT)
Dept: BEHAVIORAL/MENTAL HEALTH CLINIC | Facility: CLINIC | Age: 52
End: 2022-05-03
Payer: COMMERCIAL

## 2022-05-03 ENCOUNTER — OFFICE VISIT (OUTPATIENT)
Dept: FAMILY MEDICINE CLINIC | Facility: CLINIC | Age: 52
End: 2022-05-03
Payer: COMMERCIAL

## 2022-05-03 VITALS
SYSTOLIC BLOOD PRESSURE: 90 MMHG | RESPIRATION RATE: 14 BRPM | BODY MASS INDEX: 22.71 KG/M2 | HEIGHT: 64 IN | HEART RATE: 98 BPM | DIASTOLIC BLOOD PRESSURE: 64 MMHG | WEIGHT: 133 LBS | TEMPERATURE: 98 F

## 2022-05-03 DIAGNOSIS — F43.21 ADJUSTMENT DISORDER WITH DEPRESSED MOOD: Primary | ICD-10-CM

## 2022-05-03 DIAGNOSIS — Z12.12 ENCOUNTER FOR COLORECTAL CANCER SCREENING: ICD-10-CM

## 2022-05-03 DIAGNOSIS — F32.A ANXIETY AND DEPRESSION: Primary | ICD-10-CM

## 2022-05-03 DIAGNOSIS — Z12.11 ENCOUNTER FOR COLORECTAL CANCER SCREENING: ICD-10-CM

## 2022-05-03 DIAGNOSIS — F41.9 ANXIETY AND DEPRESSION: Primary | ICD-10-CM

## 2022-05-03 PROCEDURE — 99213 OFFICE O/P EST LOW 20 MIN: CPT | Performed by: FAMILY MEDICINE

## 2022-05-03 PROCEDURE — 90834 PSYTX W PT 45 MINUTES: CPT | Performed by: SOCIAL WORKER

## 2022-05-03 PROCEDURE — 1036F TOBACCO NON-USER: CPT | Performed by: FAMILY MEDICINE

## 2022-05-03 PROCEDURE — 3008F BODY MASS INDEX DOCD: CPT | Performed by: FAMILY MEDICINE

## 2022-05-03 RX ORDER — ESCITALOPRAM OXALATE 10 MG/1
10 TABLET ORAL DAILY
Qty: 30 TABLET | Refills: 1 | Status: SHIPPED | OUTPATIENT
Start: 2022-05-03 | End: 2022-05-25

## 2022-05-06 NOTE — PATIENT INSTRUCTIONS
Please continue to schedule a follow-up session within one week  In case of a psychiatric emergency please contact any of the following:  [facility] [phone] or [phone]     National Suicide Prevention Lifeline : 5-374.295.4243

## 2022-05-06 NOTE — PSYCH
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 [employer] and discussed her reasons for this change are for benefits, stability and financal support  Apryl Zheng reports feeling anxious, and the undersigned therapist normalized her feelings as 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  Apryl Zheng reports she is no longer talking to her ex-paramour and has been trying to remain busy  Corrie experiences feelings of guilt if she's not being productive, but the undersigned therapist encourage she accept that some days she can rest, and that it isn't a bad thing to lay in bad one day of the week  Apryl Zheng continues to attend yoga and meditation, reporting it has helped her  Apryl Zheng continues to feel "Bad" for Serenity Zuniga, her ex- and the undersigned therapist assisted Corrie process her feelings about her ex- and continued to clarify she is not responsible for his feelings  Apryl Zheng reports the anti-depressant appears to be ineffective and increase her anxiety, PCP was notified and will see her after today's session  Apryl Zheng  denies suicidal intent, gesture or ideation at this time  The undersigned therapist provided Apryl Zhneg with 45 minutes of psychotherapy  Review of Systems   Psychiatric/Behavioral: Positive for decreased concentration and dysphoric mood  The patient is nervous/anxious  Objective:     Physical Exam  Psychiatric:         Attention and Perception: Attention and perception normal          Mood and Affect: Mood is anxious  Affect is tearful           Speech: Speech normal          Behavior: Behavior normal  Behavior is cooperative  Thought Content:  Thought content normal          Cognition and Memory: Cognition and memory normal          Judgment: Judgment normal

## 2022-05-12 DIAGNOSIS — G47.00 INSOMNIA, UNSPECIFIED TYPE: ICD-10-CM

## 2022-05-12 DIAGNOSIS — F41.9 ANXIETY: ICD-10-CM

## 2022-05-13 RX ORDER — ALPRAZOLAM 0.25 MG/1
TABLET ORAL
Qty: 30 TABLET | Refills: 0 | Status: SHIPPED | OUTPATIENT
Start: 2022-05-13 | End: 2022-06-06 | Stop reason: SDUPTHER

## 2022-05-25 DIAGNOSIS — F41.9 ANXIETY AND DEPRESSION: ICD-10-CM

## 2022-05-25 DIAGNOSIS — F32.A ANXIETY AND DEPRESSION: ICD-10-CM

## 2022-05-25 RX ORDER — ESCITALOPRAM OXALATE 10 MG/1
TABLET ORAL
Qty: 30 TABLET | Refills: 1 | Status: SHIPPED | OUTPATIENT
Start: 2022-05-25 | End: 2022-05-26 | Stop reason: DRUGHIGH

## 2022-05-26 DIAGNOSIS — F32.A ANXIETY AND DEPRESSION: Primary | ICD-10-CM

## 2022-05-26 DIAGNOSIS — F41.9 ANXIETY AND DEPRESSION: Primary | ICD-10-CM

## 2022-05-26 RX ORDER — ESCITALOPRAM OXALATE 20 MG/1
20 TABLET ORAL DAILY
Qty: 30 TABLET | Refills: 2 | Status: SHIPPED | OUTPATIENT
Start: 2022-05-26 | End: 2022-08-10 | Stop reason: SDUPTHER

## 2022-05-30 NOTE — PROGRESS NOTES
Assessment/Plan:    1  Anxiety and depression  Assessment & Plan:  D/c Pristiq  Change to trial Lexapro  Cont  Counseling  2  Encounter for colorectal cancer screening  -     Ambulatory referral for colonoscopy; Future    3  BMI 23 0-23 9, adult              Subjective:      Patient ID: Ivon Thmoas is a 46 y o  female  Chief Complaint   Patient presents with    Follow-up     pt would like to discuss Pristiq she doesnt think it is working and is getting side effects ( nausous, jittery , aggitatiion -anxiety , pt gets sweaty     Virtual Regular Visit       HPI  Follow-up  Interval hx reviewed  Cont to struggle w anx/dep since martial separation  Does not feel Pristiq helping ans has experienced side effects nausea and increased "jitteriness/agitation"  Would like to change to different medication  +counseling w Amalia Moulton    The following portions of the patient's history were reviewed and updated as appropriate: allergies, current medications, past family history, past medical history, past social history, past surgical history and problem list     Review of Systems    Per hpi    Current Outpatient Medications   Medication Sig Dispense Refill    escitalopram (LEXAPRO) 20 mg tablet Take 1 tablet (20 mg total) by mouth daily 30 tablet 2    ALPRAZolam (XANAX) 0 25 mg tablet TAKE 1 TABLET BY MOUTH DAILY AT BEDTIME AS NEEDED FOR ANXIETY 30 tablet 0     No current facility-administered medications for this visit  Objective:    BP 90/64 (BP Location: Left arm, Patient Position: Sitting, Cuff Size: Large)   Pulse 98   Temp 98 °F (36 7 °C)   Resp 14   Ht 5' 3 75" (1 619 m)   Wt 60 3 kg (133 lb)   BMI 23 01 kg/m²        Physical Exam  Vitals and nursing note reviewed  Constitutional:       Appearance: Normal appearance  Cardiovascular:      Rate and Rhythm: Normal rate and regular rhythm  Pulmonary:      Effort: Pulmonary effort is normal  No respiratory distress        Breath sounds: Normal breath sounds  Abdominal:      General: Bowel sounds are normal       Palpations: Abdomen is soft  Tenderness: There is no abdominal tenderness  Musculoskeletal:      Cervical back: Neck supple  Neurological:      General: No focal deficit present  Mental Status: She is alert and oriented to person, place, and time  Cranial Nerves: No cranial nerve deficit  Psychiatric:         Thought Content:  Thought content normal          Judgment: Judgment normal       Comments: Anxious/depressed/tearful  Denies a/v halluc , denies Raciel William MD

## 2022-06-06 DIAGNOSIS — F41.9 ANXIETY: ICD-10-CM

## 2022-06-06 DIAGNOSIS — G47.00 INSOMNIA, UNSPECIFIED TYPE: ICD-10-CM

## 2022-06-06 RX ORDER — ALPRAZOLAM 0.25 MG/1
0.25 TABLET ORAL 2 TIMES DAILY PRN
Qty: 60 TABLET | Refills: 0 | Status: SHIPPED | OUTPATIENT
Start: 2022-06-06 | End: 2022-10-26

## 2022-07-07 DIAGNOSIS — B00.1 COLD SORE: Primary | ICD-10-CM

## 2022-07-07 DIAGNOSIS — H10.30 ACUTE CONJUNCTIVITIS, UNSPECIFIED ACUTE CONJUNCTIVITIS TYPE, UNSPECIFIED LATERALITY: ICD-10-CM

## 2022-07-07 RX ORDER — TOBRAMYCIN AND DEXAMETHASONE 3; 1 MG/ML; MG/ML
1 SUSPENSION/ DROPS OPHTHALMIC
Qty: 5 ML | Refills: 0 | Status: SHIPPED | OUTPATIENT
Start: 2022-07-07 | End: 2023-01-10

## 2022-07-07 RX ORDER — VALACYCLOVIR HYDROCHLORIDE 1 G/1
1000 TABLET, FILM COATED ORAL 2 TIMES DAILY
Qty: 6 TABLET | Refills: 3 | Status: SHIPPED | OUTPATIENT
Start: 2022-07-07 | End: 2022-07-10

## 2022-08-10 DIAGNOSIS — F41.9 ANXIETY AND DEPRESSION: ICD-10-CM

## 2022-08-10 DIAGNOSIS — F32.A ANXIETY AND DEPRESSION: ICD-10-CM

## 2022-08-10 RX ORDER — ESCITALOPRAM OXALATE 20 MG/1
20 TABLET ORAL DAILY
Qty: 90 TABLET | Refills: 0 | Status: SHIPPED | OUTPATIENT
Start: 2022-08-10 | End: 2022-10-26

## 2022-10-12 PROBLEM — Z13.820 SCREENING FOR OSTEOPOROSIS: Status: RESOLVED | Noted: 2018-07-06 | Resolved: 2022-10-12

## 2022-10-12 PROBLEM — Z12.4 CERVICAL CANCER SCREENING: Status: RESOLVED | Noted: 2021-05-30 | Resolved: 2022-10-12

## 2022-10-26 DIAGNOSIS — F41.9 ANXIETY: ICD-10-CM

## 2022-10-26 DIAGNOSIS — G47.00 INSOMNIA, UNSPECIFIED TYPE: ICD-10-CM

## 2022-10-26 RX ORDER — ALPRAZOLAM 0.25 MG/1
0.25 TABLET ORAL 2 TIMES DAILY PRN
Qty: 60 TABLET | Refills: 0 | Status: SHIPPED | OUTPATIENT
Start: 2022-10-26

## 2022-11-29 ENCOUNTER — OFFICE VISIT (OUTPATIENT)
Dept: FAMILY MEDICINE CLINIC | Facility: CLINIC | Age: 52
End: 2022-11-29

## 2022-11-29 DIAGNOSIS — Z63.4 GRIEF AT LOSS OF CHILD: Primary | ICD-10-CM

## 2022-11-29 DIAGNOSIS — F43.21 GRIEF AT LOSS OF CHILD: Primary | ICD-10-CM

## 2022-11-29 DIAGNOSIS — F43.10 PTSD (POST-TRAUMATIC STRESS DISORDER): ICD-10-CM

## 2022-11-29 SDOH — SOCIAL STABILITY - SOCIAL INSECURITY: DISSAPEARANCE AND DEATH OF FAMILY MEMBER: Z63.4

## 2022-12-01 PROBLEM — F43.10 PTSD (POST-TRAUMATIC STRESS DISORDER): Status: ACTIVE | Noted: 2022-12-01

## 2022-12-01 PROBLEM — Z63.4 GRIEF AT LOSS OF CHILD: Status: ACTIVE | Noted: 2022-12-01

## 2022-12-01 PROBLEM — F43.21 GRIEF AT LOSS OF CHILD: Status: ACTIVE | Noted: 2022-12-01

## 2022-12-02 ENCOUNTER — TELEPHONE (OUTPATIENT)
Dept: FAMILY MEDICINE CLINIC | Facility: CLINIC | Age: 52
End: 2022-12-02

## 2022-12-02 NOTE — PROGRESS NOTES
Assessment/Plan:  Diagnoses and all orders for this visit:    Grief at loss of child    PTSD (post-traumatic stress disorder)  cont daily lexapro  Prn alprazolam  Counseling--therapist-Alexa Mendez-#[phone]  Medical leave forms to be completed  Follow-up in 1-2 weeks, sooner prn        Subjective:      Patient ID: Mata Cuevas is a 45 y o  female  HPI  Suffering recent loss of son -Dylan Noss 24-to suicide-11/5/22  Prior to loss of son had  from -[place]  Second rwt-[employer]-tfufpvlv to college in [place] this week  Pt in counseling w Leda Cole, MSW, \[facility]\""W-#[phone]  Dx:PTSD-currently on daily Lexapro and prn Alprazolam  Diff w sleep, dec appetite, unable to focus  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[place] [address] to [address] [place] [address] x 3mths  Has papers for medical leave to be completed    The following portions of the patient's history were reviewed and updated as appropriate: allergies, current medications, past family history, past medical history, past social history, past surgical history and problem list     Review of Systems   Constitutional: Positive for activity change, appetite change and fatigue  Psychiatric/Behavioral: Positive for decreased concentration and sleep disturbance  Negative for hallucinations, self-injury and suicidal ideas  The patient is nervous/anxious            Current Outpatient Medications   Medication Sig Dispense Refill   • ALPRAZolam (XANAX) 0 25 mg tablet TAKE 1 TABLET (0 25 MG TOTAL) BY MOUTH 2 (TWO) TIMES A DAY AS NEEDED FOR ANXIETY OR SLEEP 60 tablet 0   • escitalopram (LEXAPRO) 20 mg tablet TAKE 1 TABLET BY MOUTH EVERY DAY 90 tablet 1   • tobramycin-dexamethasone (TOBRADEX) ophthalmic suspension Apply 1 drop to eye every 4 (four) hours while awake To affected eye x 3-5 days 5 mL 0   • valACYclovir (VALTREX) 1,000 mg tablet Take 1 tablet (1,000 mg total) by mouth 2 (two) times a day for 3 days 6 tablet 3 No current facility-administered medications for this visit  Objective:     Physical Exam    AAOx3  Pt grieving  Exam:deferred  Visit time spent w counseling trying to console pt           Marquis Kevin MD

## 2022-12-05 DIAGNOSIS — L30.9 DERMATITIS: Primary | ICD-10-CM

## 2022-12-05 RX ORDER — CLOTRIMAZOLE AND BETAMETHASONE DIPROPIONATE 10; .64 MG/G; MG/G
CREAM TOPICAL 2 TIMES DAILY
Qty: 30 G | Refills: 1 | Status: SHIPPED | OUTPATIENT
Start: 2022-12-05 | End: 2023-01-10

## 2023-01-10 ENCOUNTER — OFFICE VISIT (OUTPATIENT)
Dept: FAMILY MEDICINE CLINIC | Facility: CLINIC | Age: 53
End: 2023-01-10

## 2023-01-10 VITALS
WEIGHT: 140.8 LBS | HEIGHT: 64 IN | DIASTOLIC BLOOD PRESSURE: 70 MMHG | TEMPERATURE: 97.2 F | HEART RATE: 68 BPM | SYSTOLIC BLOOD PRESSURE: 90 MMHG | RESPIRATION RATE: 16 BRPM | BODY MASS INDEX: 24.04 KG/M2

## 2023-01-10 DIAGNOSIS — Z63.4 GRIEF AT LOSS OF CHILD: Primary | ICD-10-CM

## 2023-01-10 DIAGNOSIS — Z12.31 ENCOUNTER FOR SCREENING MAMMOGRAM FOR MALIGNANT NEOPLASM OF BREAST: ICD-10-CM

## 2023-01-10 DIAGNOSIS — F43.21 GRIEF AT LOSS OF CHILD: Primary | ICD-10-CM

## 2023-01-10 DIAGNOSIS — F43.10 PTSD (POST-TRAUMATIC STRESS DISORDER): ICD-10-CM

## 2023-01-10 SDOH — SOCIAL STABILITY - SOCIAL INSECURITY: DISSAPEARANCE AND DEATH OF FAMILY MEMBER: Z63.4

## 2023-01-10 NOTE — PROGRESS NOTES
Assessment/Plan:    1  Grief at loss of child    2  PTSD (post-traumatic stress disorder)    3  Encounter for screening mammogram for malignant neoplasm of breast  -     Mammo screening bilateral w 3d & cad; Future; Expected date: 01/10/2023      Cont daily Lexapro and prn alprazolam  Cont counseling   STD forms completed     Subjective:      Patient ID: Janene Yoon is a 45 y o  female  Chief Complaint   Patient presents with   • Follow-up     Follow up short term disability        HPI     Follow-up  Grieving loss of son  Cont to see therapist--Alexa Mchugh in short term disability forms for completion    The following portions of the patient's history were reviewed and updated as appropriate: allergies, current medications, past family history, past medical history, past social history, past surgical history and problem list     Review of Systems   Constitutional: Positive for activity change, appetite change and fatigue  Psychiatric/Behavioral: Positive for decreased concentration and sleep disturbance  Negative for hallucinations, self-injury and suicidal ideas  The patient is nervous/anxious  Current Outpatient Medications   Medication Sig Dispense Refill   • ALPRAZolam (XANAX) 0 25 mg tablet TAKE 1 TABLET (0 25 MG TOTAL) BY MOUTH 2 (TWO) TIMES A DAY AS NEEDED FOR ANXIETY OR SLEEP 60 tablet 1   • escitalopram (LEXAPRO) 20 mg tablet TAKE 1 TABLET BY MOUTH EVERY DAY 90 tablet 1   • valACYclovir (VALTREX) 1,000 mg tablet Take 1 tablet (1,000 mg total) by mouth 2 (two) times a day for 3 days 6 tablet 3     No current facility-administered medications for this visit  Objective:    BP 90/70 (BP Location: Left arm, Patient Position: Sitting, Cuff Size: Large)   Pulse 68   Temp (!) 97 2 °F (36 2 °C)   Resp 16   Ht 5' 3 75" (1 619 m)   Wt 63 9 kg (140 lb 12 8 oz)   BMI 24 36 kg/m²        Physical Exam  Vitals and nursing note reviewed     Constitutional:       Appearance: Normal appearance  Cardiovascular:      Rate and Rhythm: Normal rate and regular rhythm  Pulmonary:      Effort: Pulmonary effort is normal  No respiratory distress  Breath sounds: Normal breath sounds  Abdominal:      General: Bowel sounds are normal       Palpations: Abdomen is soft  Tenderness: There is no abdominal tenderness  Musculoskeletal:      Cervical back: Neck supple  Neurological:      General: No focal deficit present  Mental Status: She is alert and oriented to person, place, and time  Cranial Nerves: No cranial nerve deficit  Psychiatric:         Thought Content:  Thought content normal          Judgment: Judgment normal       Comments: Anxious/depressed/tearful  Denies a/v halluc , denies Yolette Elizalde MD

## 2023-01-18 ENCOUNTER — TELEPHONE (OUTPATIENT)
Dept: FAMILY MEDICINE CLINIC | Facility: CLINIC | Age: 53
End: 2023-01-18

## 2023-01-18 NOTE — TELEPHONE ENCOUNTER
Pt is looking into going back to her old job under her own terms for a 4 hour shift but they want her to fill out a form , she is going to try to text it to you so you can print out ,if it doesn't work she will get the form to us another way

## 2023-01-27 ENCOUNTER — OFFICE VISIT (OUTPATIENT)
Dept: FAMILY MEDICINE CLINIC | Facility: CLINIC | Age: 53
End: 2023-01-27

## 2023-01-27 VITALS
WEIGHT: 133 LBS | DIASTOLIC BLOOD PRESSURE: 74 MMHG | RESPIRATION RATE: 16 BRPM | BODY MASS INDEX: 22.71 KG/M2 | SYSTOLIC BLOOD PRESSURE: 110 MMHG | HEIGHT: 64 IN | TEMPERATURE: 98.1 F

## 2023-01-27 DIAGNOSIS — F43.10 PTSD (POST-TRAUMATIC STRESS DISORDER): ICD-10-CM

## 2023-01-27 DIAGNOSIS — F43.21 GRIEF AT LOSS OF CHILD: Primary | ICD-10-CM

## 2023-01-27 DIAGNOSIS — Z63.4 GRIEF AT LOSS OF CHILD: Primary | ICD-10-CM

## 2023-01-27 SDOH — SOCIAL STABILITY - SOCIAL INSECURITY: DISSAPEARANCE AND DEATH OF FAMILY MEMBER: Z63.4

## 2023-01-29 PROBLEM — H90.3 BILATERAL SENSORINEURAL HEARING LOSS: Status: ACTIVE | Noted: 2023-01-29

## 2023-01-30 NOTE — PROGRESS NOTES
Assessment/Plan:    Diagnoses and all orders for this visit:    Grief at loss of child    PTSD (post-traumatic stress disorder)    cont current medications  Cont counseling  Anticipated rtw 4/3/2023  STD  Forms updated    Subjective:      Patient ID: Prosper Dacosta is a 45 y o  female  Chief Complaint   Patient presents with   • Follow-up     Disability paperwork       HPI  Follow-up  Interval hx reviewed  Cont to grieve loss of son  STD needs clarification of dates  Pt out from work sooner than first ov following son's passing  Pt initially seen by grief counselor-Laxmi Mendez  Had been in telephone contact w me prior to being able to come in for appt  Pt cont w fatigue, sleep disturbances, diff w focus  Is consdering relocating to [place], [place] to be closer to her other son    The following portions of the patient's history were reviewed and updated as appropriate: allergies, current medications, past family history, past medical history, past social history, past surgical history and problem list     Review of Systems   Constitutional: Positive for activity change, appetite change, chills and fatigue  Psychiatric/Behavioral: Positive for decreased concentration and sleep disturbance  Negative for hallucinations, self-injury and suicidal ideas  The patient is nervous/anxious  Current Outpatient Medications   Medication Sig Dispense Refill   • ALPRAZolam (XANAX) 0 25 mg tablet TAKE 1 TABLET (0 25 MG TOTAL) BY MOUTH 2 (TWO) TIMES A DAY AS NEEDED FOR ANXIETY OR SLEEP 60 tablet 1   • escitalopram (LEXAPRO) 20 mg tablet TAKE 1 TABLET BY MOUTH EVERY DAY 90 tablet 1   • valACYclovir (VALTREX) 1,000 mg tablet Take 1 tablet (1,000 mg total) by mouth 2 (two) times a day for 3 days 6 tablet 3     No current facility-administered medications for this visit         Objective:    /74 (BP Location: Left arm, Patient Position: Sitting, Cuff Size: Standard)   Temp 98 1 °F (36 7 °C)   Resp 16   Ht 5' 3 75" (1 619 m)   Wt 60 3 kg (133 lb)   BMI 23 01 kg/m²        Physical Exam  Vitals and nursing note reviewed  Constitutional:       Appearance: Normal appearance  Cardiovascular:      Rate and Rhythm: Normal rate and regular rhythm  Pulmonary:      Effort: Pulmonary effort is normal  No respiratory distress  Breath sounds: Normal breath sounds  Abdominal:      General: Bowel sounds are normal       Palpations: Abdomen is soft  Tenderness: There is no abdominal tenderness  Musculoskeletal:      Cervical back: Neck supple  Neurological:      General: No focal deficit present  Mental Status: She is alert and oriented to person, place, and time  Cranial Nerves: No cranial nerve deficit  Psychiatric:         Thought Content:  Thought content normal          Judgment: Judgment normal       Comments: Anxious/depressed/tearful  Denies a/v halluc , denies Darylene Cao, MD

## 2023-03-09 DIAGNOSIS — F32.A ANXIETY AND DEPRESSION: ICD-10-CM

## 2023-03-09 DIAGNOSIS — F41.9 ANXIETY AND DEPRESSION: ICD-10-CM

## 2023-03-09 RX ORDER — ESCITALOPRAM OXALATE 20 MG/1
TABLET ORAL
Qty: 90 TABLET | Refills: 1 | Status: SHIPPED | OUTPATIENT
Start: 2023-03-09

## 2023-03-22 ENCOUNTER — HOSPITAL ENCOUNTER (OUTPATIENT)
Dept: RADIOLOGY | Facility: HOSPITAL | Age: 53
Discharge: HOME | End: 2023-03-22
Attending: FAMILY MEDICINE

## 2023-03-22 VITALS — BODY MASS INDEX: 22.71 KG/M2 | WEIGHT: 133 LBS | HEIGHT: 64 IN

## 2023-03-22 DIAGNOSIS — Z12.31 ENCOUNTER FOR SCREENING MAMMOGRAM FOR MALIGNANT NEOPLASM OF BREAST: ICD-10-CM

## 2023-04-04 DIAGNOSIS — G47.00 INSOMNIA, UNSPECIFIED TYPE: ICD-10-CM

## 2023-04-04 DIAGNOSIS — F41.9 ANXIETY: Primary | ICD-10-CM

## 2023-04-04 RX ORDER — ALPRAZOLAM 0.5 MG/1
TABLET ORAL
Qty: 60 TABLET | Refills: 0 | Status: SHIPPED | OUTPATIENT
Start: 2023-04-04

## 2023-10-05 DIAGNOSIS — F32.A ANXIETY AND DEPRESSION: ICD-10-CM

## 2023-10-05 DIAGNOSIS — F41.9 ANXIETY AND DEPRESSION: ICD-10-CM

## 2023-10-05 DIAGNOSIS — F41.9 ANXIETY: ICD-10-CM

## 2023-10-05 DIAGNOSIS — G47.00 INSOMNIA, UNSPECIFIED TYPE: ICD-10-CM

## 2023-10-05 RX ORDER — ESCITALOPRAM OXALATE 20 MG/1
20 TABLET ORAL DAILY
Qty: 90 TABLET | Refills: 1 | Status: SHIPPED | OUTPATIENT
Start: 2023-10-05

## 2023-10-05 RX ORDER — ALPRAZOLAM 0.5 MG/1
TABLET ORAL
Qty: 60 TABLET | Refills: 0 | Status: SHIPPED | OUTPATIENT
Start: 2023-10-05 | End: 2023-10-22 | Stop reason: CLARIF

## 2023-10-22 DIAGNOSIS — F41.9 ANXIETY: ICD-10-CM

## 2023-10-22 DIAGNOSIS — G47.00 INSOMNIA, UNSPECIFIED TYPE: ICD-10-CM

## 2023-10-22 RX ORDER — ALPRAZOLAM 0.5 MG/1
TABLET ORAL
Qty: 60 TABLET | Refills: 0 | Status: SHIPPED | OUTPATIENT
Start: 2023-10-22

## 2024-01-28 DIAGNOSIS — F32.A ANXIETY AND DEPRESSION: ICD-10-CM

## 2024-01-28 DIAGNOSIS — F41.9 ANXIETY AND DEPRESSION: ICD-10-CM

## 2024-01-28 RX ORDER — ESCITALOPRAM OXALATE 20 MG/1
20 TABLET ORAL DAILY
Qty: 30 TABLET | Refills: 0 | Status: SHIPPED | OUTPATIENT
Start: 2024-01-28

## 2024-02-11 DIAGNOSIS — F41.9 ANXIETY AND DEPRESSION: ICD-10-CM

## 2024-02-11 DIAGNOSIS — F32.A ANXIETY AND DEPRESSION: ICD-10-CM

## 2024-02-12 RX ORDER — ESCITALOPRAM OXALATE 20 MG/1
20 TABLET ORAL DAILY
Qty: 90 TABLET | Refills: 0 | Status: SHIPPED | OUTPATIENT
Start: 2024-02-12

## 2024-04-30 DIAGNOSIS — F32.A ANXIETY AND DEPRESSION: ICD-10-CM

## 2024-04-30 DIAGNOSIS — F41.9 ANXIETY AND DEPRESSION: ICD-10-CM

## 2024-05-02 RX ORDER — ESCITALOPRAM OXALATE 20 MG/1
20 TABLET ORAL DAILY
Qty: 90 TABLET | Refills: 0 | Status: SHIPPED | OUTPATIENT
Start: 2024-05-02

## 2024-06-12 DIAGNOSIS — F41.9 ANXIETY: ICD-10-CM

## 2024-06-12 DIAGNOSIS — G47.00 INSOMNIA, UNSPECIFIED TYPE: ICD-10-CM

## 2024-06-13 RX ORDER — ALPRAZOLAM 0.5 MG/1
TABLET ORAL
Qty: 60 TABLET | Refills: 0 | Status: SHIPPED | OUTPATIENT
Start: 2024-06-13

## 2024-06-20 ENCOUNTER — TELEPHONE (OUTPATIENT)
Dept: FAMILY MEDICINE CLINIC | Facility: CLINIC | Age: 54
End: 2024-06-20

## 2024-06-20 DIAGNOSIS — Z12.31 SCREENING MAMMOGRAM, ENCOUNTER FOR: Primary | ICD-10-CM

## 2024-06-30 NOTE — PROGRESS NOTES
Problem: At Risk for Falls  Goal: Patient does not fall  Outcome: Monitoring/Evaluating progress  Goal: Patient takes action to control fall-related risks  Outcome: Monitoring/Evaluating progress     Problem: Pain  Goal: Acceptable pain level achieved/maintained at rest using appropriate pain scale for the patient  Outcome: Monitoring/Evaluating progress  Goal: Acceptable pain level achieved/maintained with activity using appropriate pain scale for the patient  Outcome: Monitoring/Evaluating progress  Goal: Acceptable pain level achieved/maintained without oversedation  Outcome: Monitoring/Evaluating progress     Problem: Urinary Tract Infection  Goal: Urinary Tract Infection (UTI) s/s resolved  Outcome: Monitoring/Evaluating progress  Goal: Verbalizes s/s of UTI and treatment plan  Description: Document on Patient Education Activity   Outcome: Monitoring/Evaluating progress     Problem: Impaired Physical Mobility  Goal: Functional status is maintained or returned to baseline during hospitalization  Outcome: Monitoring/Evaluating progress  Goal: Tolerates activity for discharge setting with no abnormal symptoms  Outcome: Monitoring/Evaluating progress      Assessment/Plan:    Diagnoses and all orders for this visit:    Physical exam  Comments:  labs, mammo ordered  ref for GI given for eval for both EGD and colonoscopy  Pap utd as well as eye/dental care,    Dysuria  -     POCT urine dip auto non-scope    Encounter for screening for malignant neoplasm  -     Mammo screening bilateral w cad; Future    Encounter for colorectal cancer screening  -     Ambulatory referral to Gastroenterology; Future    Upper abdominal pain  -     Ambulatory referral to Gastroenterology; Future  -     Comprehensive metabolic panel; Future  -     CBC and Platelet; Future  -     TSH, 3rd generation; Future  -     Amylase; Future  -     ranitidine (ZANTAC) 150 MG capsule; Take 1 capsule (150 mg total) by mouth 2 (two) times a day    Hyperlipidemia, unspecified hyperlipidemia type  -     Comprehensive metabolic panel; Future  -     CBC and Platelet; Future  -     TSH, 3rd generation; Future  -     Lipid panel; Future  -     Amylase; Future    Anxiety  -     Comprehensive metabolic panel; Future  -     CBC and Platelet; Future  -     venlafaxine (EFFEXOR-XR) 150 mg 24 hr capsule; Take 1 capsule (150 mg total) by mouth daily    Other orders  -     RESTASIS MULTIDOSE 0 05 % ophthalmic emulsion;       Subjective:      Patient ID: Richard Dash is a 50 y o  female  Chief Complaint   Patient presents with    Physical Exam    Abdominal Pain     digestion problem       47-year-old  012 (one miscarriage) female patient in for physical exam   Eye and dental care up-to-date  Had normal Pap May 2017  Is due for mammogram and labs  Overall doing well- mood stable  Follows healthy diet and exercises on a routine basis  Has intermittent problems with GERD  Reports no blood in the stool, no vomiting  Is due for colonoscopy and evaluation for EGD          The following portions of the patient's history were reviewed and updated as appropriate: allergies, current medications, past family history, past medical history, past social history, past surgical history and problem list      Review of Systems   Constitutional: Positive for fatigue  HENT: Negative  Eyes: Negative  Respiratory: Negative  Cardiovascular: Negative  Gastrointestinal: Positive for abdominal pain  Negative for blood in stool, diarrhea, nausea and vomiting  Genitourinary: Negative  Skin: Negative  Allergic/Immunologic: Positive for environmental allergies  Neurological: Negative  Psychiatric/Behavioral: Negative  Objective:    /80 (BP Location: Left arm, Patient Position: Sitting, Cuff Size: Standard)   Pulse 76   Temp 98 2 °F (36 8 °C)   Resp 14   Ht 5' 4" (1 626 m)   Wt 60 7 kg (133 lb 12 8 oz)   LMP 05/01/2018   BMI 22 97 kg/m²        Physical Exam   Constitutional: She is oriented to person, place, and time  She appears well-developed and well-nourished  HENT:   Head: Normocephalic and atraumatic  Right Ear: External ear normal    Left Ear: External ear normal    Nose: Nose normal    Mouth/Throat: Oropharynx is clear and moist    Eyes: Conjunctivae and EOM are normal  Pupils are equal, round, and reactive to light  Neck: Normal range of motion  Neck supple  No thyromegaly present  Cardiovascular: Normal rate, regular rhythm, normal heart sounds and intact distal pulses  Exam reveals no gallop and no friction rub  No murmur heard  Pulmonary/Chest: Effort normal and breath sounds normal    Abdominal: Soft  Bowel sounds are normal  There is no tenderness  Musculoskeletal: Normal range of motion  She exhibits no edema, tenderness or deformity  Neurological: She is alert and oriented to person, place, and time  She displays normal reflexes  No cranial nerve deficit or sensory deficit  She exhibits normal muscle tone  Coordination normal    Skin: Skin is warm and dry  Capillary refill takes less than 2 seconds  No rash noted     Psychiatric: She has a normal mood and affect  Nursing note and vitals reviewed          Sandra Guzmán MD

## 2024-07-15 ENCOUNTER — OFFICE VISIT (OUTPATIENT)
Dept: FAMILY MEDICINE CLINIC | Facility: CLINIC | Age: 54
End: 2024-07-15
Payer: COMMERCIAL

## 2024-07-15 VITALS
WEIGHT: 157 LBS | TEMPERATURE: 98.4 F | RESPIRATION RATE: 16 BRPM | HEART RATE: 81 BPM | BODY MASS INDEX: 26.8 KG/M2 | HEIGHT: 64 IN | SYSTOLIC BLOOD PRESSURE: 110 MMHG | OXYGEN SATURATION: 98 % | DIASTOLIC BLOOD PRESSURE: 82 MMHG

## 2024-07-15 DIAGNOSIS — Z87.19 H/O RECTAL POLYPECTOMY: ICD-10-CM

## 2024-07-15 DIAGNOSIS — Z12.11 SCREEN FOR COLON CANCER: ICD-10-CM

## 2024-07-15 DIAGNOSIS — Z12.4 CERVICAL CANCER SCREENING: ICD-10-CM

## 2024-07-15 DIAGNOSIS — Z13.1 SCREENING FOR DIABETES MELLITUS: ICD-10-CM

## 2024-07-15 DIAGNOSIS — Z98.890 H/O RECTAL POLYPECTOMY: ICD-10-CM

## 2024-07-15 DIAGNOSIS — Z00.00 ANNUAL PHYSICAL EXAM: Primary | ICD-10-CM

## 2024-07-15 DIAGNOSIS — R10.2 PELVIC PAIN: ICD-10-CM

## 2024-07-15 DIAGNOSIS — Z63.4 GRIEF AT LOSS OF CHILD: ICD-10-CM

## 2024-07-15 DIAGNOSIS — Z13.0 SCREENING FOR DEFICIENCY ANEMIA: ICD-10-CM

## 2024-07-15 DIAGNOSIS — L30.9 DERMATITIS: ICD-10-CM

## 2024-07-15 DIAGNOSIS — R53.83 FATIGUE, UNSPECIFIED TYPE: ICD-10-CM

## 2024-07-15 DIAGNOSIS — R82.90 ABNORMAL RESULT ON SCREENING URINE TEST: ICD-10-CM

## 2024-07-15 DIAGNOSIS — F32.A ANXIETY AND DEPRESSION: ICD-10-CM

## 2024-07-15 DIAGNOSIS — F43.21 GRIEF AT LOSS OF CHILD: ICD-10-CM

## 2024-07-15 DIAGNOSIS — Z11.59 NEED FOR HEPATITIS C SCREENING TEST: ICD-10-CM

## 2024-07-15 DIAGNOSIS — F41.9 ANXIETY AND DEPRESSION: ICD-10-CM

## 2024-07-15 DIAGNOSIS — Z13.29 SCREENING FOR THYROID DISORDER: ICD-10-CM

## 2024-07-15 DIAGNOSIS — R14.0 BLOATING SYMPTOM: ICD-10-CM

## 2024-07-15 DIAGNOSIS — E78.5 BORDERLINE HYPERLIPIDEMIA: ICD-10-CM

## 2024-07-15 DIAGNOSIS — Z13.220 SCREENING FOR LIPID DISORDERS: ICD-10-CM

## 2024-07-15 LAB
SL AMB  POCT GLUCOSE, UA: ABNORMAL
SL AMB LEUKOCYTE ESTERASE,UA: 500
SL AMB POCT BILIRUBIN,UA: ABNORMAL
SL AMB POCT BLOOD,UA: ABNORMAL
SL AMB POCT CLARITY,UA: CLEAR
SL AMB POCT COLOR,UA: YELLOW
SL AMB POCT KETONES,UA: ABNORMAL
SL AMB POCT NITRITE,UA: ABNORMAL
SL AMB POCT PH,UA: 8
SL AMB POCT SPECIFIC GRAVITY,UA: 1
SL AMB POCT URINE PROTEIN: ABNORMAL
SL AMB POCT UROBILINOGEN: ABNORMAL

## 2024-07-15 PROCEDURE — 81002 URINALYSIS NONAUTO W/O SCOPE: CPT | Performed by: FAMILY MEDICINE

## 2024-07-15 PROCEDURE — 99396 PREV VISIT EST AGE 40-64: CPT | Performed by: FAMILY MEDICINE

## 2024-07-15 RX ORDER — ESCITALOPRAM OXALATE 20 MG/1
20 TABLET ORAL DAILY
Qty: 90 TABLET | Refills: 3 | Status: SHIPPED | OUTPATIENT
Start: 2024-07-15

## 2024-07-15 SDOH — SOCIAL STABILITY - SOCIAL INSECURITY: DISSAPEARANCE AND DEATH OF FAMILY MEMBER: Z63.4

## 2024-07-15 NOTE — PROGRESS NOTES
[facility] HEALTH MAINTENANCE OFFICE VISIT  [facility]    NAME: Corrie Gallo  AGE: 54 y.o. SEX: female  : 1970     DATE: 2024    Assessment and Plan     1. Annual physical exam  -     POCT urine dip  -     Hepatitis C antibody; Future  -     Lipase; Future  -     Lipid Panel with Direct LDL reflex; Future  -     Magnesium; Future  -     TSH, 3rd generation; Future  -     CBC; Future  -     Comprehensive metabolic panel; Future  -     Hemoglobin A1C; Future  -     Urine culture  -     Pap Lb (Liquid-based)  2. Anxiety and depression  -     escitalopram (LEXAPRO) 20 mg tablet; Take 1 tablet (20 mg total) by mouth daily  -     Magnesium; Future  -     TSH, 3rd generation; Future  3. Grief at loss of child  4. Fatigue, unspecified type  -     CBC; Future  5. Bloating symptom  -     US pelvis complete w transvaginal; Future; Expected date: 07/15/2024  6. Pelvic pain  -     US pelvis complete w transvaginal; Future; Expected date: 07/15/2024  -     Urine culture  7. Borderline hyperlipidemia  -     Comprehensive metabolic panel; Future  8. Abnormal result on screening urine test  -     Urine culture; Future  -     Urine culture  9. Screening for thyroid disorder  -     TSH, 3rd generation; Future  10. Screening for deficiency anemia  -     CBC; Future  11. Screening for lipid disorders  -     Lipase; Future  -     Lipid Panel with Direct LDL reflex; Future  12. Need for hepatitis C screening test  -     Hepatitis C antibody; Future  13. Screening for diabetes mellitus  -     Comprehensive metabolic panel; Future  -     Hemoglobin A1C; Future  14. Screen for colon cancer  -     Ambulatory referral to Gastroenterology; Future  15. H/O rectal polypectomy  -     Ambulatory referral to Gastroenterology; Future  16. Cervical cancer screening  -     Liquid-based pap, screening; Future  -     Pap Lb (Liquid-based)  17.  Dermatitis  Comments:  periauricular.  Orders:  -     clotrimazole-betamethasone (LOTRISONE) 1-0.05 % cream; Apply topically 2 (two) times a day To affected areas prn  18. BMI 26.0-26.9,adult      Patient Counseling:   Nutrition: Stressed importance of a well balanced diet, moderation of sodium/saturated fat, caloric balance and sufficient intake of fiber  Exercise: Stressed the importance of regular exercise with a goal of 150 minutes per week  Dental Health: Discussed daily flossing and brushing and regular dental visits   Sexuality: Discussed sexually transmitted infections, use of condoms and prevention of unintended pregnancy  Alcohol Use:  Recommended moderation of alcohol intake  Injury Prevention: Discussed Safety Belts, Safety Helmets, and Smoke Detectors    Immunizations reviewed: Risks and Benefits discussed  Discussed benefits of:  Colon Cancer Screening, Mammogram , Cervical Cancer screening, and Screening labs.  BMI Counseling: Body mass index is 26.95 kg/m². Discussed with patient's BMI with her. The BMI is above normal. Nutrition recommendations include 3-5 servings of fruits/vegetables daily, moderation in carbohydrate intake, increasing intake of lean protein, reducing intake of saturated fat and trans fat, and reducing intake of cholesterol. Exercise recommendations include exercising 3-5 times per week and strength training exercises.          Chief Complaint     Chief Complaint   Patient presents with   • Annual Exam       History of Present Illness     HPI    Well Adult Physical   Patient here for a comprehensive physical exam.      Diet and Physical Activity  Diet: well balanced diet  Exercise: intermittently      Depression Screen  PHQ-2/9 Depression Screening    Little interest or pleasure in doing things: 1 - several days  Feeling down, depressed, or hopeless: 1 - several days  Trouble falling or staying asleep, or sleeping too much: 1 - several days  Feeling tired or having little energy:  3 - nearly every day  Poor appetite or overeatin - not at all  Feeling bad about yourself - or that you are a failure or have let yourself or your family down: 1 - several days  Trouble concentrating on things, such as reading the newspaper or watching television: 0 - not at all  Moving or speaking so slowly that other people could have noticed. Or the opposite - being so fidgety or restless that you have been moving around a lot more than usual: 0 - not at all  Thoughts that you would be better off dead, or of hurting yourself in some way: 0 - not at all  PHQ-9 Score: 7  PHQ-9 Interpretation: Mild depression          General Health  Hearing: Normal:  bilateral  Vision: goes for regular eye exams  Dental: regular dental visits    Reproductive Health  Pap done today      The following portions of the patient's history were reviewed and updated as appropriate: allergies, current medications, past family history, past medical history, past social history, past surgical history and problem list.    Review of Systems     Review of Systems   Constitutional:  Positive for fatigue.   Respiratory: Negative.     Cardiovascular: Negative.    Gastrointestinal: Negative.    Musculoskeletal:  Positive for arthralgias and myalgias.   Skin:  Positive for rash.   Allergic/Immunologic: Positive for food allergies.   Neurological: Negative.    Psychiatric/Behavioral:  Positive for sleep disturbance. Negative for hallucinations, self-injury and suicidal ideas. The patient is nervous/anxious.        Past Medical History     Past Medical History:   Diagnosis Date   • Celiac disease    • Celiac disease    • Depression    • Dry eye    • GERD (gastroesophageal reflux disease)    • Oral contraceptive prescribed     last assessed - 2012       Past Surgical History     Past Surgical History:   Procedure Laterality Date   • EAR SURGERY Bilateral 2020   • NO PAST SURGERIES     • [place] COLONOSCOPY FLX DX W/COLLJ SPEC WHEN PFRMD N/A  8/8/2018    Procedure: EGD AND COLONOSCOPY;  Surgeon: Moira Harris MD;  Location: [facility];  Service: Gastroenterology       Social History     Social History     Socioeconomic History   • Marital status: Legally      Spouse name: None   • Number of children: None   • Years of education: None   • Highest education level: None   Occupational History   • Occupation: Active   Tobacco Use   • Smoking status: Former     Passive exposure: Never   • Smokeless tobacco: Never   Vaping Use   • Vaping status: Some Days   • Substances: Nicotine   Substance and Sexual Activity   • Alcohol use: Yes     Comment: occasional   • Drug use: No   • Sexual activity: Yes   Other Topics Concern   • None   Social History Narrative   • None     Social Determinants of Health     Financial Resource Strain: Not on file   Food Insecurity: Not on file   Transportation Needs: Not on file   Physical Activity: Not on file   Stress: Not on file   Social Connections: Not on file   Intimate Partner Violence: Not on file   Housing Stability: Not on file       Family History     Family History   Problem Relation Age of Onset   • Diabetes Mother    • Hypertension Mother    • Adrenal disorder Brother         Adrenal insufficiency   • No Known Problems Maternal Aunt    • No Known Problems Maternal Aunt    • No Known Problems Paternal Aunt    • No Known Problems Paternal Aunt        Current Medications       Current Outpatient Medications:   •  ALPRAZolam (XANAX) 0.5 mg tablet, One-half to one tablet po twice daily as needed for anxiety and/or insomnia, Disp: 60 tablet, Rfl: 0  •  clotrimazole-betamethasone (LOTRISONE) 1-0.05 % cream, Apply topically 2 (two) times a day To affected areas prn, Disp: 45 g, Rfl: 3  •  escitalopram (LEXAPRO) 20 mg tablet, Take 1 tablet (20 mg total) by mouth daily, Disp: 90 tablet, Rfl: 3     Allergies     Allergies   Allergen Reactions   • Gluten Meal - Food Allergy GI Intolerance       Objective     /82  "(BP Location: Left arm, Patient Position: Sitting, Cuff Size: Standard)   Pulse 81   Temp 98.4 °F (36.9 °C) (Temporal)   Resp 16   Ht 5' 4\" (1.626 m)   Wt 71.2 kg (157 lb)   LMP 02/15/2023 Comment: irregular period - not pregnant  SpO2 98%   BMI 26.95 kg/m²      Physical Exam  Vitals and nursing note reviewed.   Constitutional:       Appearance: Normal appearance.   HENT:      Nose: Nose normal.   Eyes:      General: No scleral icterus.     Conjunctiva/sclera: Conjunctivae normal.   Cardiovascular:      Rate and Rhythm: Normal rate and regular rhythm.   Pulmonary:      Effort: Pulmonary effort is normal. No respiratory distress.      Breath sounds: Normal breath sounds.   Abdominal:      General: Bowel sounds are normal.      Palpations: Abdomen is soft.      Tenderness: There is abdominal tenderness (mild suprapubic). There is no right CVA tenderness or left CVA tenderness.   Genitourinary:     General: Normal vulva.      Vagina: No vaginal discharge.      Comments: Breast exam: No SC or axillary LAD  No discrete palp. Mass  No nipple d/c    Pelvic:No v/v lesion or sig vag d/c  Mild pelvic tenderness/bloating  Musculoskeletal:         General: Normal range of motion.      Cervical back: Neck supple.      Right lower leg: No edema.      Left lower leg: No edema.   Skin:     General: Skin is warm and dry.      Coloration: Skin is not jaundiced.      Findings: Rash (periauricular seborrhea) present.   Neurological:      General: No focal deficit present.      Mental Status: She is alert and oriented to person, place, and time.      Cranial Nerves: No cranial nerve deficit.   Psychiatric:         Behavior: Behavior normal.         Thought Content: Thought content normal.         Judgment: Judgment normal.      Comments: Tearful at moments speaking of late son  Denies a/v halluc., denies SI         Sherie Grubbs MD  [facility]  "

## 2024-07-17 ENCOUNTER — PREP FOR PROCEDURE (OUTPATIENT)
Age: 54
End: 2024-07-17

## 2024-07-17 ENCOUNTER — TELEPHONE (OUTPATIENT)
Age: 54
End: 2024-07-17

## 2024-07-17 DIAGNOSIS — Z12.11 SCREENING FOR COLON CANCER: Primary | ICD-10-CM

## 2024-07-17 NOTE — TELEPHONE ENCOUNTER
Scheduled date of colonoscopy (as of today): 9/20/24  Physician performing colonoscopy: BRETT  Location of colonoscopy:  [facility]  Bowel prep reviewed with patient:  nargis/radha  Instructions to MYC     07/17/24  Screened by: Trihs Sinclair    Referring Provider     Pre- Screening:     There is no height or weight on file to calculate BMI. 26.95  Has patient been referred for a routine screening Colonoscopy? yes  Is the patient between 45-75 years old? yes      Previous Colonoscopy yes   If yes:    Date: 2021    Facility:     Reason:       Does the patient want to see a Gastroenterologist prior to their procedure OR are they having any GI symptoms? no    Has the patient been hospitalized or had abdominal surgery in the past 6 months? no    Does the patient use supplemental oxygen? no    Does the patient take Coumadin, Lovenox, Plavix, Elliquis, Xarelto, or other blood thinning medication? no    Has the patient had a stroke, cardiac event, or stent placed in the past year? no      If patient is between 45yrs - 49yrs, please advise patient that we will have to confirm benefits & coverage with their insurance company for a routine screening colonoscopy.

## 2024-07-18 PROBLEM — L30.9 DERMATITIS: Status: ACTIVE | Noted: 2024-07-18

## 2024-07-18 RX ORDER — CLOTRIMAZOLE AND BETAMETHASONE DIPROPIONATE 10; .64 MG/G; MG/G
CREAM TOPICAL 2 TIMES DAILY
Qty: 45 G | Refills: 3 | Status: SHIPPED | OUTPATIENT
Start: 2024-07-18

## 2024-07-20 LAB
HBA1C MFR BLD HPLC: 5.3 %
HCV AB SER-ACNC: NONREACTIVE

## 2024-07-21 LAB
CYTOLOGIST CVX/VAG CYTO: ABNORMAL
DX ICD CODE: ABNORMAL
DX ICD CODE: ABNORMAL
OTHER STN SPEC: ABNORMAL
PATH REPORT.FINAL DX SPEC: ABNORMAL
PATHOLOGIST CVX/VAG CYTO: ABNORMAL
RECOM F/U CVX/VAG CYTO: ABNORMAL
SL AMB NOTE:: ABNORMAL
SL AMB SPECIMEN ADEQUACY: ABNORMAL

## 2024-07-24 DIAGNOSIS — Z12.31 SCREENING MAMMOGRAM, ENCOUNTER FOR: ICD-10-CM

## 2024-07-25 ENCOUNTER — TELEPHONE (OUTPATIENT)
Age: 54
End: 2024-07-25

## 2024-07-25 NOTE — TELEPHONE ENCOUNTER
Pt called, looking for results of blood work and pap smear, she saw results on MyChart and pap smear is abnormal.

## 2024-07-26 ENCOUNTER — TELEPHONE (OUTPATIENT)
Age: 54
End: 2024-07-26

## 2024-07-26 DIAGNOSIS — R87.612 LOW GRADE SQUAMOUS INTRAEPITHELIAL LESION ON CYTOLOGIC SMEAR OF CERVIX (LGSIL): Primary | ICD-10-CM

## 2024-07-26 NOTE — TELEPHONE ENCOUNTER
Spoke with Chey, who clarified that she corrected the account number on the urine culture and then sent the HPV order paperwork over the team responsible for that particular order.

## 2024-07-26 NOTE — TELEPHONE ENCOUNTER
Chey with [facility] called states got incorrect information from Ryleigh. Tried to call office to speak with Ryleigh and no answer. Chey needs Urine culture order form. No HPV order. If there is any questions Chey can be reached at [phone]

## 2024-07-29 LAB
BACTERIA UR CULT: NORMAL
Lab: NO GROWTH

## 2024-07-31 ENCOUNTER — VBI (OUTPATIENT)
Dept: ADMINISTRATIVE | Facility: OTHER | Age: 54
End: 2024-07-31

## 2024-08-06 ENCOUNTER — TELEPHONE (OUTPATIENT)
Dept: FAMILY MEDICINE CLINIC | Facility: CLINIC | Age: 54
End: 2024-08-06

## 2024-08-07 NOTE — TELEPHONE ENCOUNTER
Spoke w pt:  Pelvic u/s:  Uterus anteverted.  Uterine myometrial heterogeneity w/o mass or fibroid.  Polypoid endometrial thickening at superior uterine body-fundus,  Endometrial stripe=1.5 cm.  Left ovary wnl.  Right ovary not identified,  Consider MRI &/or hysteroscopy.    Recent abnormal Pap w +HPV.  Recent mammogram wnl.    Advised pt schedule gyn evaluation  for follow-up on above as outlined.  Will await further specialist eval/tx.

## 2024-08-14 PROBLEM — Z12.4 CERVICAL CANCER SCREENING: Status: RESOLVED | Noted: 2021-05-30 | Resolved: 2024-08-14

## 2024-08-14 PROBLEM — Z11.59 NEED FOR HEPATITIS C SCREENING TEST: Status: RESOLVED | Noted: 2024-07-15 | Resolved: 2024-08-14

## 2024-08-14 PROBLEM — Z12.11 SCREEN FOR COLON CANCER: Status: RESOLVED | Noted: 2018-07-06 | Resolved: 2024-08-14

## 2024-08-17 LAB — HPV I/H RISK 4 DNA CVX QL PROBE+SIG AMP: POSITIVE

## 2024-09-06 ENCOUNTER — ANESTHESIA EVENT (OUTPATIENT)
Dept: ANESTHESIOLOGY | Facility: HOSPITAL | Age: 54
End: 2024-09-06

## 2024-09-06 ENCOUNTER — ANESTHESIA (OUTPATIENT)
Dept: ANESTHESIOLOGY | Facility: HOSPITAL | Age: 54
End: 2024-09-06

## 2024-09-20 ENCOUNTER — ANESTHESIA (OUTPATIENT)
Dept: GASTROENTEROLOGY | Facility: AMBULARY SURGERY CENTER | Age: 54
End: 2024-09-20
Payer: COMMERCIAL

## 2024-09-20 ENCOUNTER — HOSPITAL ENCOUNTER (OUTPATIENT)
Dept: GASTROENTEROLOGY | Facility: AMBULARY SURGERY CENTER | Age: 54
Setting detail: OUTPATIENT SURGERY
End: 2024-09-20
Attending: INTERNAL MEDICINE
Payer: COMMERCIAL

## 2024-09-20 VITALS
RESPIRATION RATE: 20 BRPM | SYSTOLIC BLOOD PRESSURE: 137 MMHG | OXYGEN SATURATION: 97 % | TEMPERATURE: 97.9 F | DIASTOLIC BLOOD PRESSURE: 78 MMHG | HEART RATE: 75 BPM

## 2024-09-20 DIAGNOSIS — Z12.11 SCREENING FOR COLON CANCER: ICD-10-CM

## 2024-09-20 DIAGNOSIS — Z86.0100 HISTORY OF COLON POLYPS: ICD-10-CM

## 2024-09-20 PROCEDURE — 45385 COLONOSCOPY W/LESION REMOVAL: CPT | Performed by: INTERNAL MEDICINE

## 2024-09-20 PROCEDURE — 88305 TISSUE EXAM BY PATHOLOGIST: CPT | Performed by: PATHOLOGY

## 2024-09-20 RX ORDER — LIDOCAINE HYDROCHLORIDE 10 MG/ML
INJECTION, SOLUTION EPIDURAL; INFILTRATION; INTRACAUDAL; PERINEURAL AS NEEDED
Status: DISCONTINUED | OUTPATIENT
Start: 2024-09-20 | End: 2024-09-20

## 2024-09-20 RX ORDER — SODIUM CHLORIDE, SODIUM LACTATE, POTASSIUM CHLORIDE, CALCIUM CHLORIDE 600; 310; 30; 20 MG/100ML; MG/100ML; MG/100ML; MG/100ML
125 INJECTION, SOLUTION INTRAVENOUS CONTINUOUS
Status: CANCELLED | OUTPATIENT
Start: 2024-09-20

## 2024-09-20 RX ORDER — SODIUM CHLORIDE, SODIUM LACTATE, POTASSIUM CHLORIDE, CALCIUM CHLORIDE 600; 310; 30; 20 MG/100ML; MG/100ML; MG/100ML; MG/100ML
INJECTION, SOLUTION INTRAVENOUS CONTINUOUS PRN
Status: DISCONTINUED | OUTPATIENT
Start: 2024-09-20 | End: 2024-09-20

## 2024-09-20 RX ORDER — PROPOFOL 10 MG/ML
INJECTION, EMULSION INTRAVENOUS AS NEEDED
Status: DISCONTINUED | OUTPATIENT
Start: 2024-09-20 | End: 2024-09-20

## 2024-09-20 RX ORDER — PROPOFOL 10 MG/ML
INJECTION, EMULSION INTRAVENOUS CONTINUOUS PRN
Status: DISCONTINUED | OUTPATIENT
Start: 2024-09-20 | End: 2024-09-20

## 2024-09-20 RX ADMIN — PROPOFOL 130 MG: 10 INJECTION, EMULSION INTRAVENOUS at 11:02

## 2024-09-20 RX ADMIN — SODIUM CHLORIDE, SODIUM LACTATE, POTASSIUM CHLORIDE, AND CALCIUM CHLORIDE: .6; .31; .03; .02 INJECTION, SOLUTION INTRAVENOUS at 10:42

## 2024-09-20 RX ADMIN — LIDOCAINE HYDROCHLORIDE 50 MG: 10 INJECTION, SOLUTION EPIDURAL; INFILTRATION; INTRACAUDAL at 11:02

## 2024-09-20 RX ADMIN — PROPOFOL 130 MCG/KG/MIN: 10 INJECTION, EMULSION INTRAVENOUS at 11:02

## 2024-09-20 NOTE — ANESTHESIA PREPROCEDURE EVALUATION
Procedure:  COLONOSCOPY    Relevant Problems   CARDIO   (+) Borderline hyperlipidemia      GI/HEPATIC   (+) Gastroesophageal reflux disease with esophagitis      NEURO/PSYCH   (+) Anxiety and depression   (+) PTSD (post-traumatic stress disorder)        Physical Exam    Airway    Mallampati score: II  TM Distance: >3 FB  Neck ROM: full     Dental   Comment: Denies loose teeth     Cardiovascular  Cardiovascular exam normal    Pulmonary  Pulmonary exam normal     Other Findings  Portions of exam deferred due to low yield and/or unknown COVID statuspost-pubertal.      Anesthesia Plan  ASA Score- 2     Anesthesia Type- IV sedation with anesthesia with ASA Monitors.         Additional Monitors:     Airway Plan:            Plan Factors-Exercise tolerance (METS): >4 METS.    Chart reviewed.   Existing labs reviewed. Patient summary reviewed.    Patient is not a current smoker.              Induction- intravenous.    Postoperative Plan-         Informed Consent- Anesthetic plan and risks discussed with patient.  I personally reviewed this patient with the CRNA. Discussed and agreed on the Anesthesia Plan with the CRNA..

## 2024-09-20 NOTE — ANESTHESIA POSTPROCEDURE EVALUATION
Post-Op Assessment Note    CV Status:  Stable  Pain Score: 0    Pain management: adequate       Mental Status:  Alert, awake and sleepy   Hydration Status:  Euvolemic   PONV Controlled:  Controlled   Airway Patency:  Patent     Post Op Vitals Reviewed: Yes    No anethesia notable event occurred.    Staff: Anesthesiologist, CRNA   Comments: Report given to recovering rN, VSS, Pt resting comfortably. See chart for vitals            BP      Temp      Pulse     Resp      SpO2

## 2024-09-20 NOTE — H&P
History and Physical -  [facility]  Corrie Gallo 54 y.o. female MRN: [id number]        HPI: 54-year-old female with history of colon polyps.  Regular bowel movements.    Historical Information   Past Medical History:   Diagnosis Date    Celiac disease     Celiac disease 2019    Depression     Dry eye     GERD (gastroesophageal reflux disease)     Oral contraceptive prescribed     last assessed - 12Nov2012     Past Surgical History:   Procedure Laterality Date    EAR SURGERY Bilateral 12/2020    NO PAST SURGERIES      [place] COLONOSCOPY FLX DX W/COLLJ SPEC WHEN PFRMD N/A 8/8/2018    Procedure: EGD AND COLONOSCOPY;  Surgeon: Moira Harris MD;  Location: [facility];  Service: Gastroenterology     Social History   Social History     Substance and Sexual Activity   Alcohol Use Yes    Comment: occasional     Social History     Substance and Sexual Activity   Drug Use No     Social History     Tobacco Use   Smoking Status Former    Passive exposure: Never   Smokeless Tobacco Never     Family History   Problem Relation Age of Onset    Diabetes Mother     Hypertension Mother     Adrenal disorder Brother         Adrenal insufficiency    No Known Problems Maternal Aunt     No Known Problems Maternal Aunt     No Known Problems Paternal Aunt     No Known Problems Paternal Aunt        Meds/Allergies     Not in a hospital admission.    Allergies   Allergen Reactions    Gluten Meal - Food Allergy GI Intolerance       Objective     Blood pressure 114/68, pulse 71, temperature 97.9 °F (36.6 °C), temperature source Temporal, resp. rate 18, last menstrual period 02/15/2023, SpO2 100%.    Physical Exam:    Chest- CTA  Heart- RRR  Abdomen- NT/ND  Extremities- No edema    ASSESSMENT:     History of colon polyps    PLAN:    Colonoscopy

## 2024-09-23 PROCEDURE — 88305 TISSUE EXAM BY PATHOLOGIST: CPT | Performed by: PATHOLOGY

## 2025-07-07 DIAGNOSIS — F32.A ANXIETY AND DEPRESSION: ICD-10-CM

## 2025-07-07 DIAGNOSIS — F41.9 ANXIETY AND DEPRESSION: ICD-10-CM

## 2025-07-09 RX ORDER — ESCITALOPRAM OXALATE 20 MG/1
20 TABLET ORAL DAILY
Qty: 30 TABLET | Refills: 0 | Status: SHIPPED | OUTPATIENT
Start: 2025-07-09

## 2025-07-09 NOTE — TELEPHONE ENCOUNTER
Patient needs an appointment. Please contact the patient to schedule an appointment. Last office visit: 7/15/24

## 2025-07-30 DIAGNOSIS — F32.A ANXIETY AND DEPRESSION: ICD-10-CM

## 2025-07-30 DIAGNOSIS — F41.9 ANXIETY AND DEPRESSION: ICD-10-CM

## 2025-07-31 RX ORDER — ESCITALOPRAM OXALATE 20 MG/1
20 TABLET ORAL DAILY
Qty: 90 TABLET | Refills: 3 | Status: SHIPPED | OUTPATIENT
Start: 2025-07-31

## (undated) DEVICE — ENDOCUFF VISION SMALL PURPLE ID 10.4

## (undated) DEVICE — TRAP POLY

## (undated) DEVICE — REM POLYHESIVE ADULT PATIENT RETURN ELECTRODE: Brand: VALLEYLAB

## (undated) DEVICE — SINGLE-USE BIOPSY FORCEPS: Brand: RADIAL JAW 4

## (undated) DEVICE — SINGLE-USE POLYPECTOMY SNARE: Brand: SENSATION SHORT THROW